# Patient Record
Sex: FEMALE | Race: WHITE | NOT HISPANIC OR LATINO | Employment: FULL TIME | URBAN - NONMETROPOLITAN AREA
[De-identification: names, ages, dates, MRNs, and addresses within clinical notes are randomized per-mention and may not be internally consistent; named-entity substitution may affect disease eponyms.]

---

## 2017-03-29 ENCOUNTER — APPOINTMENT (OUTPATIENT)
Dept: LAB | Facility: HOSPITAL | Age: 47
End: 2017-03-29

## 2017-03-29 PROCEDURE — 86003 ALLG SPEC IGE CRUDE XTRC EA: CPT | Performed by: NURSE PRACTITIONER

## 2017-03-29 PROCEDURE — 80053 COMPREHEN METABOLIC PANEL: CPT | Performed by: NURSE PRACTITIONER

## 2017-03-29 PROCEDURE — 84439 ASSAY OF FREE THYROXINE: CPT | Performed by: NURSE PRACTITIONER

## 2017-03-29 PROCEDURE — 84443 ASSAY THYROID STIM HORMONE: CPT | Performed by: NURSE PRACTITIONER

## 2017-04-04 ENCOUNTER — OFFICE VISIT (OUTPATIENT)
Dept: PULMONOLOGY | Facility: CLINIC | Age: 47
End: 2017-04-04

## 2017-04-04 VITALS
BODY MASS INDEX: 34.23 KG/M2 | HEIGHT: 62 IN | HEART RATE: 99 BPM | OXYGEN SATURATION: 98 % | SYSTOLIC BLOOD PRESSURE: 119 MMHG | DIASTOLIC BLOOD PRESSURE: 77 MMHG | WEIGHT: 186 LBS

## 2017-04-04 DIAGNOSIS — R05.3 CHRONIC COUGH: Primary | ICD-10-CM

## 2017-04-04 DIAGNOSIS — J45.41 MODERATE PERSISTENT ASTHMA WITH ACUTE EXACERBATION: ICD-10-CM

## 2017-04-04 DIAGNOSIS — J30.9 ALLERGIC RHINITIS, UNSPECIFIED ALLERGIC RHINITIS TRIGGER, UNSPECIFIED RHINITIS SEASONALITY: ICD-10-CM

## 2017-04-04 PROCEDURE — 94060 EVALUATION OF WHEEZING: CPT | Performed by: INTERNAL MEDICINE

## 2017-04-04 PROCEDURE — 99204 OFFICE O/P NEW MOD 45 MIN: CPT | Performed by: INTERNAL MEDICINE

## 2017-04-04 PROCEDURE — 94727 GAS DIL/WSHOT DETER LNG VOL: CPT | Performed by: INTERNAL MEDICINE

## 2017-04-04 PROCEDURE — 94729 DIFFUSING CAPACITY: CPT | Performed by: INTERNAL MEDICINE

## 2017-04-04 RX ORDER — PREDNISONE 10 MG/1
TABLET ORAL
Qty: 22 TABLET | Refills: 0 | Status: SHIPPED | OUTPATIENT
Start: 2017-04-04 | End: 2017-04-25

## 2017-04-04 RX ORDER — MONTELUKAST SODIUM 10 MG/1
10 TABLET ORAL NIGHTLY
Qty: 30 TABLET | Refills: 11 | Status: SHIPPED | OUTPATIENT
Start: 2017-04-04 | End: 2017-12-31 | Stop reason: SDUPTHER

## 2017-04-04 RX ORDER — ALBUTEROL SULFATE 90 UG/1
2 AEROSOL, METERED RESPIRATORY (INHALATION) EVERY 4 HOURS PRN
Qty: 1 INHALER | Refills: 11 | Status: SHIPPED | OUTPATIENT
Start: 2017-04-04 | End: 2018-07-17 | Stop reason: SDUPTHER

## 2017-04-04 NOTE — PROGRESS NOTES
Pulmonary Consultation    Subjective     Chief Complaint   Patient presents with   • Bronchitis     Ref by Jadyn Bryant        History of Present Illness  Lora Steiner is a 46 y.o. female with a PMH significant for anemia and back pain who presents for evaluation of chronic cough. Pt states she has had recurrent cough and congestion since October when she had an illness. She was treated for bronchitis with abx and steroids. Her cough recurred in November and she received additional abx. She improved some but it did not completely resolved. Pt caught the flu in February and her cough worsened. She was treated with abx and tamiflu but her cough persisted. She went to  last week and given Levaquin as well as a breathing treatment. Pt does report the breathing treatment helped for a few hours but it wore off. She is a never smoker, but her parents smoked. She denies prior history of asthma. Her father has COPD and her son has asthma. She has seasonal allergies and reports nasal congestion and postnasal gtt. Pt has occasional thick yellow phlegm, but denies hemoptysis. She does have some wheezing and trouble lying flat at night. Her dyspnea with exertion and sometimes at rest. 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     Review of Systems: History obtained from chart review and the patient.  Review of Systems   HENT: Positive for congestion and postnasal drip.    Respiratory: Positive for cough, shortness of breath and wheezing.    Cardiovascular: Positive for leg swelling.   Gastrointestinal:        Heartburn   Musculoskeletal: Positive for back pain.   Neurological: Positive for headaches.     As described in the HPI. Otherwise, remainder of ROS (14 systems) were negative.    Patient Active Problem List   Diagnosis   • Abdominal pain   • Abnormal mammogram   • Back pain   • Skin sensation disturbance   • Abnormal radiographic examination   • Otalgia   • Allergic rhinitis   •  "Moderate persistent asthma with acute exacerbation   • Chronic cough         Current Outpatient Prescriptions:   •  benzonatate (TESSALON) 200 MG capsule, Take 1 capsule by mouth 3 (Three) Times a Day As Needed for Cough., Disp: 30 capsule, Rfl: 0  •  gabapentin (NEURONTIN) 100 MG capsule, Take 100 mg by mouth 3 (Three) Times a Day., Disp: , Rfl:   •  levonorgestrel (MIRENA) 20 MCG/24HR IUD, 1 each by Intrauterine route., Disp: , Rfl:   •  OMEPRAZOLE PO, Take  by mouth., Disp: , Rfl:   •  ranitidine (ZANTAC) 150 MG capsule, Take 1 capsule by mouth 2 (Two) Times a Day for 30 days., Disp: 60 capsule, Rfl: 0  •  albuterol (VENTOLIN HFA) 108 (90 BASE) MCG/ACT inhaler, Inhale 2 puffs Every 4 (Four) Hours As Needed for Wheezing or Shortness of Air., Disp: 1 inhaler, Rfl: 11  •  Fluticasone Furoate-Vilanterol 100-25 MCG/INH aerosol powder , Inhale 1 puff Daily., Disp: 1 each, Rfl: 11  •  montelukast (SINGULAIR) 10 MG tablet, Take 1 tablet by mouth Every Night., Disp: 30 tablet, Rfl: 11  •  predniSONE (DELTASONE) 10 MG tablet, Take 40mg PO x 3d, then 30mg PO x 2d, then 20mg PO x 2d then stop, Disp: 22 tablet, Rfl: 0    History reviewed. No pertinent past medical history.  Past Surgical History:   Procedure Laterality Date   • VAGINAL DELIVERY      hx of 3 spontaneous abortions in 1995,1996,2002     Social History     Social History   • Marital status:      Spouse name: N/A   • Number of children: N/A   • Years of education: N/A     Social History Main Topics   • Smoking status: Never Smoker   • Smokeless tobacco: Never Used   • Alcohol use None   • Drug use: None   • Sexual activity: Not Asked     Other Topics Concern   • None     Social History Narrative     Family History   Problem Relation Age of Onset   • Hypertension Father    • Asthma Other    • Cancer Other    • Diabetes Other    • Kidney disease Other    • Migraines Other           Objective     Blood pressure 119/77, pulse 99, height 62\" (157.5 cm), weight " 186 lb (84.4 kg), SpO2 98 %.  Physical Exam   Constitutional: She is oriented to person, place, and time. Vital signs are normal. She appears well-developed and well-nourished.   HENT:   Head: Normocephalic and atraumatic.   Nose: Mucosal edema and rhinorrhea present.   Mouth/Throat: Uvula is midline, oropharynx is clear and moist and mucous membranes are normal.   Mallampati 3   Eyes: Conjunctivae, EOM and lids are normal. Pupils are equal, round, and reactive to light.   Neck: Trachea normal and normal range of motion. No tracheal tenderness present. No thyroid mass present.   Cardiovascular: Normal rate, regular rhythm and normal heart sounds.  Exam reveals no gallop.    No murmur heard.  Pulmonary/Chest: Effort normal and breath sounds normal. No respiratory distress. She has no decreased breath sounds. She has no wheezes. She has no rhonchi. Chest wall is not dull to percussion. She exhibits no tenderness.   Cough with deep breathing   Abdominal: Soft. Normal appearance and bowel sounds are normal. There is no tenderness.   Lymphadenopathy:        Head (right side): No submandibular adenopathy present.        Head (left side): No submandibular adenopathy present.     She has no cervical adenopathy.        Right: No supraclavicular adenopathy present.        Left: No supraclavicular adenopathy present.   Neurological: She is alert and oriented to person, place, and time.   Skin: Skin is warm and dry. No cyanosis. Nails show no clubbing.   Psychiatric: She has a normal mood and affect. Her speech is normal and behavior is normal. Judgment normal.   Nursing note and vitals reviewed.      PFTs: 4/4/17  Ratio 77  FVC 2.31/68%  FEV1 1.78/66%  TLC 2.84/59%  RV/%  DLCO 22.01/102%  Mixed obstruction and restriction with significant bronchodilator response.  Air trapping.  Normal diffusion.  No comparative data available.     Radiology (independently reviewed and interpreted by me): CXR 3/28/17 showed no acute  cardiopulmonary disease       Assessment/Plan     Lora was seen today for bronchitis.    Diagnoses and all orders for this visit:    Chronic cough    Allergic rhinitis, unspecified allergic rhinitis trigger, unspecified rhinitis seasonality    Moderate persistent asthma with acute exacerbation  -     albuterol (VENTOLIN HFA) 108 (90 BASE) MCG/ACT inhaler; Inhale 2 puffs Every 4 (Four) Hours As Needed for Wheezing or Shortness of Air.  -     Fluticasone Furoate-Vilanterol 100-25 MCG/INH aerosol powder ; Inhale 1 puff Daily.  -     montelukast (SINGULAIR) 10 MG tablet; Take 1 tablet by mouth Every Night.  -     IgE Level; Future  -     predniSONE (DELTASONE) 10 MG tablet; Take 40mg PO x 3d, then 30mg PO x 2d, then 20mg PO x 2d then stop         Discussion/ Recommendations:   PFTs are most consistent with asthma given the reversibility.  I think she likely has adult onset asthma which may be allergy related and was triggered by bronchitis in the fall.  She did have an allergy profile done which showed multiple environmental allergies, but she is not currently on any therapy so I'll hold on referral to an allergist.    -Start Breo 100 daily.  Samples provided and instructed on use.  Provided with co-pay card.  -Start albuterol as needed.  Instructed on proper technique.  -7 day prednisone taper to help calm her symptoms.  -Start Singulair daily.  Consider addition of nasal steroid.  -Check IgE level.  -Follow-up in 2 weeks to reassess.         Return in about 2 weeks (around 4/18/2017) for Recheck.      Thank you for allowing me to participate in the care of Lora Steiner. Please do not hesitate to contact me with any questions.         This document has been electronically signed by Clau Barlow MD on April 4, 2017 2:37 PM      EMR Dragon/Transcription disclaimer:     Much of this encounter note is an electronic transcription/translation of spoken language to printed text. The electronic translation of spoken  language may permit erroneous, or at times, nonsensical words or phrases to be inadvertently transcribed; Although I have reviewed the note for such errors, some may still exist.

## 2017-04-04 NOTE — PATIENT INSTRUCTIONS
Take prednisone taper as directed  Start Breo 1 puff daily.  Rinse mouth well after use.  Use albuterol 2 puffs every 4 hours as needed.

## 2017-04-20 DIAGNOSIS — K21.9 GASTROESOPHAGEAL REFLUX DISEASE, ESOPHAGITIS PRESENCE NOT SPECIFIED: ICD-10-CM

## 2017-04-25 ENCOUNTER — OFFICE VISIT (OUTPATIENT)
Dept: PULMONOLOGY | Facility: CLINIC | Age: 47
End: 2017-04-25

## 2017-04-25 VITALS
OXYGEN SATURATION: 99 % | DIASTOLIC BLOOD PRESSURE: 69 MMHG | BODY MASS INDEX: 34.36 KG/M2 | SYSTOLIC BLOOD PRESSURE: 128 MMHG | WEIGHT: 186.7 LBS | HEIGHT: 62 IN | HEART RATE: 79 BPM

## 2017-04-25 DIAGNOSIS — J45.41 MODERATE PERSISTENT ASTHMA WITH ACUTE EXACERBATION: ICD-10-CM

## 2017-04-25 DIAGNOSIS — J30.9 ALLERGIC RHINITIS, UNSPECIFIED ALLERGIC RHINITIS TRIGGER, UNSPECIFIED RHINITIS SEASONALITY: ICD-10-CM

## 2017-04-25 DIAGNOSIS — R05.3 CHRONIC COUGH: Primary | ICD-10-CM

## 2017-04-25 PROCEDURE — 99214 OFFICE O/P EST MOD 30 MIN: CPT | Performed by: INTERNAL MEDICINE
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
## 2017-04-27 RX ORDER — RANITIDINE 150 MG/1
CAPSULE ORAL
Qty: 60 CAPSULE | Refills: 0 | OUTPATIENT
Start: 2017-04-27

## 2017-05-22 ENCOUNTER — OFFICE VISIT (OUTPATIENT)
Dept: PULMONOLOGY | Facility: CLINIC | Age: 47
End: 2017-05-22

## 2017-05-22 VITALS
BODY MASS INDEX: 34.78 KG/M2 | SYSTOLIC BLOOD PRESSURE: 121 MMHG | DIASTOLIC BLOOD PRESSURE: 76 MMHG | HEIGHT: 62 IN | OXYGEN SATURATION: 98 % | WEIGHT: 189 LBS | HEART RATE: 89 BPM

## 2017-05-22 DIAGNOSIS — J45.40 MODERATE PERSISTENT ASTHMA WITHOUT COMPLICATION: Primary | ICD-10-CM

## 2017-05-22 DIAGNOSIS — J30.9 ALLERGIC RHINITIS, UNSPECIFIED ALLERGIC RHINITIS TRIGGER, UNSPECIFIED RHINITIS SEASONALITY: ICD-10-CM

## 2017-05-22 PROCEDURE — 99214 OFFICE O/P EST MOD 30 MIN: CPT | Performed by: INTERNAL MEDICINE

## 2017-05-22 RX ORDER — FLUTICASONE PROPIONATE 50 MCG
2 SPRAY, SUSPENSION (ML) NASAL DAILY
Qty: 1 EACH | Refills: 11 | Status: SHIPPED | OUTPATIENT
Start: 2017-05-22 | End: 2017-06-21

## 2017-05-23 DIAGNOSIS — J30.9 ALLERGIC RHINITIS, UNSPECIFIED ALLERGIC RHINITIS TRIGGER, UNSPECIFIED RHINITIS SEASONALITY: ICD-10-CM

## 2017-05-31 DIAGNOSIS — B37.0 THRUSH: Primary | ICD-10-CM

## 2017-06-24 ENCOUNTER — LAB (OUTPATIENT)
Dept: LAB | Facility: HOSPITAL | Age: 47
End: 2017-06-24

## 2017-06-24 ENCOUNTER — TRANSCRIBE ORDERS (OUTPATIENT)
Dept: LAB | Facility: HOSPITAL | Age: 47
End: 2017-06-24

## 2017-06-24 DIAGNOSIS — R10.13 EPIGASTRIC PAIN: ICD-10-CM

## 2017-06-24 DIAGNOSIS — R10.13 EPIGASTRIC PAIN: Primary | ICD-10-CM

## 2017-06-24 LAB
ALBUMIN SERPL-MCNC: 3.9 G/DL (ref 3.4–4.8)
ALBUMIN/GLOB SERPL: 1.1 G/DL (ref 1.1–1.8)
ALP SERPL-CCNC: 67 U/L (ref 38–126)
ALT SERPL W P-5'-P-CCNC: 28 U/L (ref 9–52)
AMYLASE SERPL-CCNC: 53 U/L (ref 50–130)
ANION GAP SERPL CALCULATED.3IONS-SCNC: 10 MMOL/L (ref 5–15)
AST SERPL-CCNC: 16 U/L (ref 14–36)
BILIRUB SERPL-MCNC: 0.5 MG/DL (ref 0.2–1.3)
BUN BLD-MCNC: 10 MG/DL (ref 7–21)
BUN/CREAT SERPL: 12.5 (ref 7–25)
CALCIUM SPEC-SCNC: 8.9 MG/DL (ref 8.4–10.2)
CHLORIDE SERPL-SCNC: 100 MMOL/L (ref 95–110)
CO2 SERPL-SCNC: 28 MMOL/L (ref 22–31)
CREAT BLD-MCNC: 0.8 MG/DL (ref 0.5–1)
DEPRECATED RDW RBC AUTO: 41.5 FL (ref 36.4–46.3)
ERYTHROCYTE [DISTWIDTH] IN BLOOD BY AUTOMATED COUNT: 14.4 % (ref 11.5–14.5)
GFR SERPL CREATININE-BSD FRML MDRD: 77 ML/MIN/1.73 (ref 60–135)
GLOBULIN UR ELPH-MCNC: 3.7 GM/DL (ref 2.3–3.5)
GLUCOSE BLD-MCNC: 92 MG/DL (ref 60–100)
HCT VFR BLD AUTO: 39.4 % (ref 35–45)
HGB BLD-MCNC: 12.8 G/DL (ref 12–15.5)
LIPASE SERPL-CCNC: 30 U/L (ref 23–300)
MCH RBC QN AUTO: 25.7 PG (ref 26.5–34)
MCHC RBC AUTO-ENTMCNC: 32.5 G/DL (ref 31.4–36)
MCV RBC AUTO: 79 FL (ref 80–98)
PLATELET # BLD AUTO: 257 10*3/MM3 (ref 150–450)
PMV BLD AUTO: 10 FL (ref 8–12)
POTASSIUM BLD-SCNC: 4.3 MMOL/L (ref 3.5–5.1)
PROT SERPL-MCNC: 7.6 G/DL (ref 6.3–8.6)
RBC # BLD AUTO: 4.99 10*6/MM3 (ref 3.77–5.16)
SODIUM BLD-SCNC: 138 MMOL/L (ref 137–145)
TSH SERPL DL<=0.05 MIU/L-ACNC: 0.69 MIU/ML (ref 0.46–4.68)
WBC NRBC COR # BLD: 9.21 10*3/MM3 (ref 3.2–9.8)

## 2017-06-24 PROCEDURE — 82150 ASSAY OF AMYLASE: CPT

## 2017-06-24 PROCEDURE — 36415 COLL VENOUS BLD VENIPUNCTURE: CPT

## 2017-06-24 PROCEDURE — 80053 COMPREHEN METABOLIC PANEL: CPT

## 2017-06-24 PROCEDURE — 85027 COMPLETE CBC AUTOMATED: CPT

## 2017-06-24 PROCEDURE — 83690 ASSAY OF LIPASE: CPT

## 2017-06-24 PROCEDURE — 84443 ASSAY THYROID STIM HORMONE: CPT

## 2017-06-30 ENCOUNTER — TRANSCRIBE ORDERS (OUTPATIENT)
Dept: LAB | Facility: HOSPITAL | Age: 47
End: 2017-06-30

## 2017-08-17 ENCOUNTER — APPOINTMENT (OUTPATIENT)
Dept: CT IMAGING | Facility: HOSPITAL | Age: 47
End: 2017-08-17

## 2017-08-17 ENCOUNTER — HOSPITAL ENCOUNTER (EMERGENCY)
Facility: HOSPITAL | Age: 47
Discharge: HOME | End: 2017-08-18
Attending: EMERGENCY MEDICINE | Admitting: EMERGENCY MEDICINE

## 2017-08-17 DIAGNOSIS — R51.9 NONINTRACTABLE EPISODIC HEADACHE, UNSPECIFIED HEADACHE TYPE: Primary | ICD-10-CM

## 2017-08-17 PROCEDURE — 70450 CT HEAD/BRAIN W/O DYE: CPT

## 2017-08-17 PROCEDURE — 99283 EMERGENCY DEPT VISIT LOW MDM: CPT

## 2017-08-18 VITALS
DIASTOLIC BLOOD PRESSURE: 82 MMHG | HEIGHT: 62 IN | BODY MASS INDEX: 34.96 KG/M2 | RESPIRATION RATE: 20 BRPM | OXYGEN SATURATION: 98 % | WEIGHT: 190 LBS | SYSTOLIC BLOOD PRESSURE: 141 MMHG | TEMPERATURE: 98.2 F | HEART RATE: 63 BPM

## 2017-08-18 NOTE — ED PROVIDER NOTES
Subjective   Patient is a 46 y.o. female presenting with headaches.   History provided by:  Patient   used: No    Headache   Pain location:  R parietal, R temporal and frontal  Quality:  Sharp and stabbing  Radiates to:  L neck and R neck  Severity currently:  3/10  Severity at highest:  8/10  Onset quality:  Sudden  Duration:  6 hours  Timing:  Constant  Progression:  Partially resolved  Chronicity:  Recurrent  Similar to prior headaches: no    Context: not activity, not exposure to bright light, not caffeine, not coughing, not defecating, not eating, not stress, not exposure to cold air, not intercourse, not loud noise and not straining    Relieved by:  NSAIDs  Worsened by:  Nothing  Associated symptoms: blurred vision, nausea and photophobia    Associated symptoms: no abdominal pain, no back pain, no congestion, no cough, no diarrhea, no drainage, no ear pain, no eye pain, no facial pain, no fatigue, no fever, no focal weakness, no hearing loss, no loss of balance, no myalgias, no near-syncope, no neck pain, no neck stiffness, no numbness, no paresthesias, no seizures, no sinus pressure, no sore throat, no swollen glands, no syncope, no tingling, no URI, no visual change, no vomiting and no weakness    Risk factors: no anger, no family hx of SAH, does not have insomnia and lifestyle not sedentary        Review of Systems   Constitutional: Negative for fatigue and fever.   HENT: Negative for congestion, ear pain, hearing loss, postnasal drip, sinus pressure and sore throat.    Eyes: Positive for blurred vision and photophobia. Negative for pain.   Respiratory: Negative for cough.    Cardiovascular: Negative for syncope and near-syncope.   Gastrointestinal: Positive for nausea. Negative for abdominal pain, diarrhea and vomiting.   Musculoskeletal: Negative for back pain, myalgias, neck pain and neck stiffness.   Neurological: Positive for headaches. Negative for focal weakness, seizures,  weakness, numbness, paresthesias and loss of balance.   All other systems reviewed and are negative.      No past medical history on file.    No Known Allergies    Past Surgical History:   Procedure Laterality Date   • VAGINAL DELIVERY      hx of 3 spontaneous abortions in 1995,1996,2002       Family History   Problem Relation Age of Onset   • Hypertension Father    • Asthma Other    • Cancer Other    • Diabetes Other    • Kidney disease Other    • Migraines Other        Social History     Social History   • Marital status:      Spouse name: N/A   • Number of children: N/A   • Years of education: N/A     Social History Main Topics   • Smoking status: Never Smoker   • Smokeless tobacco: Never Used   • Alcohol use Not on file   • Drug use: Not on file   • Sexual activity: Not on file     Other Topics Concern   • Not on file     Social History Narrative           Objective   Physical Exam   Constitutional: She is oriented to person, place, and time. She appears well-developed and well-nourished.   HENT:   Head: Normocephalic and atraumatic.   Mouth/Throat: Oropharynx is clear and moist.   Eyes: Conjunctivae are normal. Pupils are equal, round, and reactive to light.   Neck: Normal range of motion. Neck supple.   Cardiovascular: Normal rate, regular rhythm and intact distal pulses.    Pulmonary/Chest: Effort normal and breath sounds normal.   Abdominal: Soft. Bowel sounds are normal.   Musculoskeletal: Normal range of motion.   Neurological: She is alert and oriented to person, place, and time. She exhibits normal muscle tone. Coordination normal.   Skin: Skin is warm and dry.   Nursing note and vitals reviewed.      Procedures         ED Course  ED Course      Labs Reviewed - No data to display    CT Head Without Contrast   Final Result   No acute intracranial abnormality.      Electronically signed by:  Anival Garner  8/17/2017 11:30 PM   CDT Workstation: [id number]                  [facility]  Number of  Diagnoses or Management Options  Diagnosis management comments: Patient comes in with a headache although this particular headache was different than her routine headache and it did get improved after patient took Excedrin.  Patient is now any meningeal signs and is quite comfortable awake alert and oriented.  Patient's CT of head is negative for any bleed.  I do not think patient needs a lumbar puncture as patient examination is benign and I discussed options with the patient and patient agrees with current management and will follow up with primary care physician regarding further care       Amount and/or Complexity of Data Reviewed  Tests in the radiology section of CPT®: reviewed  Discussion of test results with the performing providers: yes  Obtain history from someone other than the patient: yes  Discuss the patient with other providers: yes    Risk of Complications, Morbidity, and/or Mortality  Presenting problems: high  Diagnostic procedures: high  Management options: high    Patient Progress  Patient progress: improved      Final diagnoses:   Nonintractable episodic headache, unspecified headache type            Todd Weeks MD  08/18/17 0006

## 2017-08-25 ENCOUNTER — OFFICE VISIT (OUTPATIENT)
Dept: INTERNAL MEDICINE | Facility: CLINIC | Age: 47
End: 2017-08-25

## 2017-08-25 VITALS
WEIGHT: 189.6 LBS | DIASTOLIC BLOOD PRESSURE: 80 MMHG | SYSTOLIC BLOOD PRESSURE: 120 MMHG | BODY MASS INDEX: 34.89 KG/M2 | HEIGHT: 62 IN

## 2017-08-25 DIAGNOSIS — G43.109 MIGRAINE WITH AURA AND WITHOUT STATUS MIGRAINOSUS, NOT INTRACTABLE: Primary | ICD-10-CM

## 2017-08-25 PROCEDURE — 99203 OFFICE O/P NEW LOW 30 MIN: CPT | Performed by: INTERNAL MEDICINE

## 2017-08-25 RX ORDER — ZOLMITRIPTAN 5 MG/1
TABLET, FILM COATED ORAL
Qty: 30 TABLET | Refills: 1 | Status: SHIPPED | OUTPATIENT
Start: 2017-08-25 | End: 2018-07-10

## 2017-08-25 NOTE — PROGRESS NOTES
Subjective       Patient is in for recheck after ER visit for headache. She has history of migraines and has been having headaches several times a month.  On Gabapentin 200 mg qhs for prophylaxis.States that headaches are not as severe as in the past but the last headache was different. It was accompanied by blurry vision and photophobia. She denies any other neurologic complaints.  CT head done in ER did not show any acute abnormalities.    Past Surgical History:   Procedure Laterality Date   • VAGINAL DELIVERY      hx of 3 spontaneous abortions in 1995,1996,2002       Social History   Substance Use Topics   • Smoking status: Never Smoker   • Smokeless tobacco: Never Used   • Alcohol use Not on file     Family History   Problem Relation Age of Onset   • Hypertension Father    • Asthma Other    • Cancer Other    • Diabetes Other    • Kidney disease Other    • Migraines Other      No Known Allergies  Current Outpatient Prescriptions on File Prior to Visit   Medication Sig Dispense Refill   • albuterol (VENTOLIN HFA) 108 (90 BASE) MCG/ACT inhaler Inhale 2 puffs Every 4 (Four) Hours As Needed for Wheezing or Shortness of Air. 1 inhaler 11   • Fluticasone Furoate-Vilanterol 200-25 MCG/INH aerosol powder  Inhale 1 puff Daily. 1 each 11   • gabapentin (NEURONTIN) 100 MG capsule Take 100 mg by mouth 3 (Three) Times a Day.     • levonorgestrel (MIRENA) 20 MCG/24HR IUD 1 each by Intrauterine route.     • montelukast (SINGULAIR) 10 MG tablet Take 1 tablet by mouth Every Night. 30 tablet 11   • OMEPRAZOLE PO Take  by mouth.     • [DISCONTINUED] nystatin (MYCOSTATIN) 933579 UNIT/ML suspension Take 5 mL by mouth 4 (Four) Times a Day. 280 mL 0     No current facility-administered medications on file prior to visit.      Review of Systems   Constitutional: Negative for fatigue.   HENT: Negative for postnasal drip and sinus pressure.    Eyes: Positive for visual disturbance.   Respiratory: Negative for chest tightness and shortness  of breath.    Cardiovascular: Negative for chest pain, palpitations and leg swelling.   Gastrointestinal: Negative for abdominal pain, nausea and vomiting.   Endocrine: Negative for cold intolerance, heat intolerance, polydipsia and polyuria.   Skin: Negative for color change and rash.   Neurological: Positive for headaches. Negative for dizziness, tremors, syncope, speech difficulty, light-headedness and numbness.       Objective   Physical Exam   Constitutional: She is oriented to person, place, and time. She appears well-developed and well-nourished.   HENT:   Head: Normocephalic and atraumatic.   Eyes: Conjunctivae and EOM are normal. Pupils are equal, round, and reactive to light.   Neck: Neck supple. No JVD present. No thyromegaly present.   Cardiovascular: Normal rate and regular rhythm.    Pulmonary/Chest: Effort normal and breath sounds normal.   Abdominal: Soft. Bowel sounds are normal.   Neurological: She is alert and oriented to person, place, and time. She has normal reflexes. She displays normal reflexes. No cranial nerve deficit. Coordination normal.   Skin: Skin is warm and dry.   Psychiatric: She has a normal mood and affect. Her behavior is normal. Thought content normal.   Nursing note and vitals reviewed.          Patient Active Problem List   Diagnosis   • Abdominal pain   • Abnormal mammogram   • Back pain   • Skin sensation disturbance   • Abnormal radiographic examination   • Otalgia   • Allergic rhinitis   • Moderate persistent asthma without complication   • Chronic cough     Lab on 06/24/2017   Component Date Value Ref Range Status   • TSH 06/24/2017 0.690  0.460 - 4.680 mIU/mL Final   Lab on 06/24/2017   Component Date Value Ref Range Status   • Lipase 06/24/2017 30  23 - 300 U/L Final   Lab on 06/24/2017   Component Date Value Ref Range Status   • Amylase 06/24/2017 53  50 - 130 U/L Final   Lab on 06/24/2017   Component Date Value Ref Range Status   • Glucose 06/24/2017 92  60 - 100  mg/dL Final   • BUN 06/24/2017 10  7 - 21 mg/dL Final   • Creatinine 06/24/2017 0.80  0.50 - 1.00 mg/dL Final   • Sodium 06/24/2017 138  137 - 145 mmol/L Final   • Potassium 06/24/2017 4.3  3.5 - 5.1 mmol/L Final   • Chloride 06/24/2017 100  95 - 110 mmol/L Final   • CO2 06/24/2017 28.0  22.0 - 31.0 mmol/L Final   • Calcium 06/24/2017 8.9  8.4 - 10.2 mg/dL Final   • Total Protein 06/24/2017 7.6  6.3 - 8.6 g/dL Final   • Albumin 06/24/2017 3.90  3.40 - 4.80 g/dL Final   • ALT (SGPT) 06/24/2017 28  9 - 52 U/L Final   • AST (SGOT) 06/24/2017 16  14 - 36 U/L Final   • Alkaline Phosphatase 06/24/2017 67  38 - 126 U/L Final   • Total Bilirubin 06/24/2017 0.5  0.2 - 1.3 mg/dL Final   • eGFR Non  Amer 06/24/2017 77  >60 mL/min/1.73 Final   • Globulin 06/24/2017 3.7* 2.3 - 3.5 gm/dL Final   • A/G Ratio 06/24/2017 1.1  1.1 - 1.8 g/dL Final   • BUN/Creatinine Ratio 06/24/2017 12.5  7.0 - 25.0 Final   • Anion Gap 06/24/2017 10.0  5.0 - 15.0 mmol/L Final   Lab on 06/24/2017   Component Date Value Ref Range Status   • WBC 06/24/2017 9.21  3.20 - 9.80 10*3/mm3 Final   • RBC 06/24/2017 4.99  3.77 - 5.16 10*6/mm3 Final   • Hemoglobin 06/24/2017 12.8  12.0 - 15.5 g/dL Final   • Hematocrit 06/24/2017 39.4  35.0 - 45.0 % Final   • MCV 06/24/2017 79.0* 80.0 - 98.0 fL Final   • MCH 06/24/2017 25.7* 26.5 - 34.0 pg Final   • MCHC 06/24/2017 32.5  31.4 - 36.0 g/dL Final   • RDW 06/24/2017 14.4  11.5 - 14.5 % Final   • RDW-SD 06/24/2017 41.5  36.4 - 46.3 fl Final   • MPV 06/24/2017 10.0  8.0 - 12.0 fL Final   • Platelets 06/24/2017 257  150 - 450 10*3/mm3 Final   Admission on 03/28/2017, Discharged on 03/28/2017   Component Date Value Ref Range Status   • Class Description 03/29/2017 Comment   Final   • D. pteronyssinus (dust mite) 03/29/2017 <0.10  Class 0 kU/L Final   • D. farinae (dust mite) 03/29/2017 <0.10  Class 0 kU/L Final   • Cat Dander 03/29/2017 <0.10  Class 0 kU/L Final   • Dog Dander, IgE 03/29/2017 <0.10  Class 0 kU/L  Final   • [place] Grass 03/29/2017 <0.10  Class 0 kU/L Final   • [place], Perennial 03/29/2017 <0.10  Class 0 kU/L Final   • Kumar Grass 03/29/2017 <0.10  Class 0 kU/L Final   • [place] Grass 03/29/2017 <0.10  Class 0 kU/L Final   • Cockroach, American 03/29/2017 <0.10  Class 0 kU/L Final   • Penicillium chrysogen 03/29/2017 <0.10  Class 0 kU/L Final   • Cladosporium herbarum 03/29/2017 <0.10  Class 0 kU/L Final   • Aspergillus fumigatus 03/29/2017 <0.10  Class 0 kU/L Final   • Mucor racemosus 03/29/2017 <0.10  Class 0 kU/L Final   • Alternaria alternata 03/29/2017 <0.10  Class 0 kU/L Final   • Stemphylium herbarum 03/29/2017 <0.10  Class 0 kU/L Final   • Elm, American 03/29/2017 0.22* Class 0/I kU/L Final   • Eucalyptus 03/29/2017 <0.10  Class 0 kU/L Final   • Maple/Box Elder 03/29/2017 <0.10  Class 0 kU/L Final   • Fraser, Italian 03/29/2017 <0.10  Class 0 kU/L Final   • Pepper Tree 03/29/2017 0.21* Class 0/I kU/L Final   • [place], Live/[place] 03/29/2017 <0.10  Class 0 kU/L Final   • Privet, Common 03/29/2017 <0.10  Class 0 kU/L Final   • Bayberry 03/29/2017 <0.10  Class 0 kU/L Final   • Silverio Palm 03/29/2017 <0.10  Class 0 kU/L Final   • Ragweed, Common/Short 03/29/2017 <0.10  Class 0 kU/L Final   • English Plantain 03/29/2017 <0.10  Class 0 kU/L Final   • Lamb's Quarter 03/29/2017 0.13* Class 0/I kU/L Final   • Sheep Sorrel 03/29/2017 <0.10  Class 0 kU/L Final   • Dog Fennel 03/29/2017 <0.10  Class 0 kU/L Final   • Class Description 03/29/2017 Comment   Final   • Egg White 03/29/2017 <0.10  Class 0 kU/L Final   • Milk, Cow's 03/29/2017 0.14* Class 0/I kU/L Final   • CodFish 03/29/2017 <0.10  Class 0 kU/L Final   • Wheat 03/29/2017 1.66* Class III kU/L Final   • Rye 03/29/2017 0.61* Class II kU/L Final   • Barley 03/29/2017 0.42* Class I kU/L Final   • Oats 03/29/2017 0.18* Class 0/I kU/L Final   • Corn 03/29/2017 0.56* Class II kU/L Final   • Rice 03/29/2017 0.12* Class 0/I kU/L Final   • Peanut 03/29/2017 0.32*  Class I kU/L Final   • Soybean 03/29/2017 <0.10  Class 0 kU/L Final   • White Bean 03/29/2017 <0.10  Class 0 kU/L Final   • Crab 03/29/2017 <0.10  Class 0 kU/L Final   • Shrimp 03/29/2017 <0.10  Class 0 kU/L Final   • Tomato 03/29/2017 0.40* Class I kU/L Final   • Pork 03/29/2017 <0.10  Class 0 kU/L Final   • Beef 03/29/2017 <0.10  Class 0 kU/L Final   • Carrot 03/29/2017 <0.10  Class 0 kU/L Final   • Orange 03/29/2017 <0.10  Class 0 kU/L Final   • Potato 03/29/2017 <0.10  Class 0 kU/L Final   • Tuna 03/29/2017 <0.10  Class 0 kU/L Final   • Chicken 03/29/2017 <0.10  Class 0 kU/L Final   • Green Bell Pepper 03/29/2017 <0.10  Class 0 kU/L Final   • Lettuce 03/29/2017 0.23* Class 0/I kU/L Final   • Cabbage 03/29/2017 0.14* Class 0/I kU/L Final   • Grape 03/29/2017 0.45* Class I kU/L Final   • Glucose 03/29/2017 82  60 - 100 mg/dL Final   • BUN 03/29/2017 13  7 - 21 mg/dL Final   • Creatinine 03/29/2017 0.83  0.50 - 1.00 mg/dL Final   • Sodium 03/29/2017 135* 137 - 145 mmol/L Final   • Potassium 03/29/2017 3.5  3.5 - 5.1 mmol/L Final   • Chloride 03/29/2017 97  95 - 110 mmol/L Final   • CO2 03/29/2017 25.0  22.0 - 31.0 mmol/L Final   • Calcium 03/29/2017 9.1  8.4 - 10.2 mg/dL Final   • Total Protein 03/29/2017 7.9  6.3 - 8.6 g/dL Final   • Albumin 03/29/2017 4.50  3.40 - 4.80 g/dL Final   • ALT (SGPT) 03/29/2017 33  9 - 52 U/L Final   • AST (SGOT) 03/29/2017 24  14 - 36 U/L Final   • Alkaline Phosphatase 03/29/2017 68  38 - 126 U/L Final   • Total Bilirubin 03/29/2017 0.4  0.2 - 1.3 mg/dL Final   • eGFR Non African Amer 03/29/2017 74  58 - 135 mL/min/1.73 Final   • Globulin 03/29/2017 3.4  2.3 - 3.5 gm/dL Final   • A/G Ratio 03/29/2017 1.3  1.1 - 1.8 g/dL Final   • BUN/Creatinine Ratio 03/29/2017 15.7  7.0 - 25.0 Final   • Anion Gap 03/29/2017 13.0  5.0 - 15.0 mmol/L Final   • TSH 03/29/2017 0.880  0.460 - 4.680 mIU/mL Final   • Free T4 03/29/2017 1.33  0.78 - 2.19 ng/dL Final       Assessment    Diagnosis Plan   1.  Migraine with aura and without status migrainosus, not intractable           Plan    Discussed nature of the condition and type of migraines with the patient.  Neurontin will be increased to 300 mg qhs.  Zomig 5 mg PRN acute headaches.  Side effects of the medication discussed with the patient.      Follow up in 1 month.

## 2017-09-06 ENCOUNTER — OFFICE VISIT (OUTPATIENT)
Dept: PULMONOLOGY | Facility: CLINIC | Age: 47
End: 2017-09-06

## 2017-09-06 VITALS
BODY MASS INDEX: 35.15 KG/M2 | HEART RATE: 78 BPM | DIASTOLIC BLOOD PRESSURE: 90 MMHG | WEIGHT: 191 LBS | OXYGEN SATURATION: 99 % | HEIGHT: 62 IN | SYSTOLIC BLOOD PRESSURE: 112 MMHG

## 2017-09-06 DIAGNOSIS — J45.41 MODERATE PERSISTENT ASTHMA WITH ACUTE EXACERBATION: ICD-10-CM

## 2017-09-06 DIAGNOSIS — J30.9 ALLERGIC RHINITIS, UNSPECIFIED ALLERGIC RHINITIS TRIGGER, UNSPECIFIED RHINITIS SEASONALITY: ICD-10-CM

## 2017-09-06 DIAGNOSIS — R05.3 CHRONIC COUGH: ICD-10-CM

## 2017-09-06 DIAGNOSIS — R06.02 SHORTNESS OF BREATH: Primary | ICD-10-CM

## 2017-09-06 DIAGNOSIS — J06.9 UPPER RESPIRATORY TRACT INFECTION, UNSPECIFIED TYPE: ICD-10-CM

## 2017-09-06 PROCEDURE — 99214 OFFICE O/P EST MOD 30 MIN: CPT | Performed by: INTERNAL MEDICINE

## 2017-09-06 PROCEDURE — 96372 THER/PROPH/DIAG INJ SC/IM: CPT | Performed by: INTERNAL MEDICINE

## 2017-09-06 RX ORDER — BENZONATATE 100 MG/1
100 CAPSULE ORAL 3 TIMES DAILY PRN
Qty: 42 CAPSULE | Refills: 1 | Status: SHIPPED | OUTPATIENT
Start: 2017-09-06 | End: 2017-09-20

## 2017-09-06 RX ORDER — METHYLPREDNISOLONE ACETATE 80 MG/ML
80 INJECTION, SUSPENSION INTRA-ARTICULAR; INTRALESIONAL; INTRAMUSCULAR; SOFT TISSUE ONCE
Status: COMPLETED | OUTPATIENT
Start: 2017-09-06 | End: 2017-09-06

## 2017-09-06 RX ORDER — PREDNISONE 20 MG/1
40 TABLET ORAL DAILY
Qty: 10 TABLET | Refills: 0 | Status: SHIPPED | OUTPATIENT
Start: 2017-09-06 | End: 2017-09-11

## 2017-09-06 RX ORDER — AZITHROMYCIN 250 MG/1
TABLET, FILM COATED ORAL
Qty: 5 TABLET | Refills: 0 | Status: SHIPPED | OUTPATIENT
Start: 2017-09-06 | End: 2017-09-20

## 2017-09-06 RX ADMIN — METHYLPREDNISOLONE ACETATE 80 MG: 80 INJECTION, SUSPENSION INTRA-ARTICULAR; INTRALESIONAL; INTRAMUSCULAR; SOFT TISSUE at 15:06

## 2017-09-06 NOTE — PROGRESS NOTES
Pulmonary Office Follow-up    Subjective     Lora Steiner is seen today at the office for   Chief Complaint   Patient presents with   • Breathing Problem         HPI  Lora Steiner is a 46 y.o. female with a PMH significant for asthma, anemia, and back pain who presents for follow-up of asthma and chronic cough.  She was last seen by me on 5/22/17, she was doing well on Breo. She reports last Friday she had onset of congestion and dyspnea. Pt does have a cough productive of brown sputum. She does have some nasal congestion, hoarseness, sore throat, and a temp of 102.4 on Saturday. Pt denies sick contacts. She continues on Breo and is now using her albuterol several times daily. Pt reports an asthma attack on Saturday which took using her albuterol six times before she got relief.       Patient Active Problem List   Diagnosis   • Abdominal pain   • Abnormal mammogram   • Back pain   • Skin sensation disturbance   • Abnormal radiographic examination   • Otalgia   • Allergic rhinitis   • Moderate persistent asthma without complication   • Chronic cough       Review of Systems  Review of Systems   Constitutional: Positive for fever.   HENT: Positive for congestion, postnasal drip, sore throat and voice change.    Respiratory: Positive for cough (rare), chest tightness, shortness of breath and wheezing.    Cardiovascular: Negative for leg swelling.     As described in the HPI. Otherwise, remainder of ROS (14 systems) were negative.    Medications, Allergies, Social, and Family Histories reviewed as per EMR.    Objective     Vitals:    09/06/17 1419   BP: 112/90   Pulse: 78   SpO2: 99%     Physical Exam   Constitutional: She is oriented to person, place, and time. Vital signs are normal. She appears well-developed and well-nourished.   HENT:   Head: Normocephalic and atraumatic.   Nose: Mucosal edema present.   Mouth/Throat: Uvula is midline, oropharynx is clear and moist and mucous membranes are normal.    Mallampati 3   Eyes: Conjunctivae, EOM and lids are normal.   Neck: Trachea normal and normal range of motion. No tracheal tenderness present. No thyroid mass present.   Cardiovascular: Normal rate, regular rhythm and normal heart sounds.  Exam reveals no gallop.    No murmur heard.  Pulmonary/Chest: Effort normal. No respiratory distress. She has no decreased breath sounds. She has wheezes (with cough). She has no rhonchi.   Abdominal: Soft. Normal appearance and bowel sounds are normal. There is no tenderness.   Lymphadenopathy:        Head (right side): No submandibular adenopathy present.        Head (left side): No submandibular adenopathy present.     She has no cervical adenopathy.        Right: No supraclavicular adenopathy present.        Left: No supraclavicular adenopathy present.   Neurological: She is alert and oriented to person, place, and time.   Skin: Skin is warm and dry. No cyanosis. Nails show no clubbing.   Psychiatric: She has a normal mood and affect. Her speech is normal and behavior is normal. Judgment normal.   Nursing note and vitals reviewed.          Assessment/Plan     Lora was seen today for breathing problem.    Diagnoses and all orders for this visit:    Shortness of breath    Moderate persistent asthma with acute exacerbation  -     azithromycin (ZITHROMAX) 250 MG tablet; Take 2 tablets the first day, then 1 tablet daily for 4 days.  -     predniSONE (DELTASONE) 20 MG tablet; Take 2 tablets by mouth Daily for 5 days.  -     methylPREDNISolone acetate (DEPO-medrol) injection 80 mg; Inject 1 mL into the shoulder, thigh, or buttocks 1 (One) Time.  -     benzonatate (TESSALON) 100 MG capsule; Take 1 capsule by mouth 3 (Three) Times a Day As Needed for Cough.    Chronic cough    Allergic rhinitis, unspecified allergic rhinitis trigger, unspecified rhinitis seasonality    Upper respiratory tract infection, unspecified type         Discussion/ Recommendations:   I think she is  suffering from an acute asthma exacerbation likely triggered by contraction of a viral URI.  Given that she did have fever as well as productive cough, I think it's is necessary to treat with antibiotic condition to steroids.    -Depo-Medrol 80 mg IM now followed by prednisone 40 mg daily for the next 5 days.  -Z-Christopher.  -Continue Breo daily and albuterol as needed.  -If her symptoms worsen or she has recurrence of an acute asthma attack not relieved by albuterol, she is instructed to go to the ED.  -Continue Singulair and Flonase.    Return in about 2 weeks (around 9/20/2017) for Recheck.          This document has been electronically signed by Clau Barlow MD on September 6, 2017 3:26 PM      EMR Dragon/Transcription disclaimer:     Much of this encounter note is an electronic transcription/translation of spoken language to printed text. The electronic translation of spoken language may permit erroneous, or at times, nonsensical words or phrases to be inadvertently transcribed; Although I have reviewed the note for such errors, some may still exist.

## 2017-09-20 ENCOUNTER — OFFICE VISIT (OUTPATIENT)
Dept: PULMONOLOGY | Facility: CLINIC | Age: 47
End: 2017-09-20

## 2017-09-20 VITALS
WEIGHT: 191 LBS | DIASTOLIC BLOOD PRESSURE: 86 MMHG | HEART RATE: 80 BPM | OXYGEN SATURATION: 98 % | BODY MASS INDEX: 35.15 KG/M2 | HEIGHT: 62 IN | SYSTOLIC BLOOD PRESSURE: 110 MMHG

## 2017-09-20 DIAGNOSIS — R05.3 CHRONIC COUGH: ICD-10-CM

## 2017-09-20 DIAGNOSIS — J30.9 ALLERGIC RHINITIS, UNSPECIFIED ALLERGIC RHINITIS TRIGGER, UNSPECIFIED RHINITIS SEASONALITY: ICD-10-CM

## 2017-09-20 DIAGNOSIS — J45.40 MODERATE PERSISTENT ASTHMA WITHOUT COMPLICATION: Primary | ICD-10-CM

## 2017-09-20 PROCEDURE — 99214 OFFICE O/P EST MOD 30 MIN: CPT | Performed by: INTERNAL MEDICINE

## 2017-09-20 NOTE — PROGRESS NOTES
Pulmonary Office Follow-up    Subjective     Lora Steiner is seen today at the office for   Chief Complaint   Patient presents with   • Follow-up     2 week         HPI  Lora Steiner is a 46 y.o. female with a PMH significant for asthma, anemia, and back pain who presents for follow-up of asthma and chronic cough.  She was last seen by me on 9/6/17, at which time she was suffering from an acute asthma exacerbation likely secondary to URI so I treated her with steroids and a Z-Christopher. She improved some but her dyspnea and cough have not returned to baseline. She is still having cough productive of clear mucus as well as chest heaviness and occasional wheeze.  Patient denies fevers, nasal congestion, or postnasal drip.  She did complete a Z-Christopher as well as the steroids as I prescribed at her last visit.    Patient Active Problem List   Diagnosis   • Abdominal pain   • Abnormal mammogram   • Back pain   • Skin sensation disturbance   • Abnormal radiographic examination   • Otalgia   • Allergic rhinitis   • Moderate persistent asthma without complication   • Chronic cough       Review of Systems  Review of Systems   Constitutional: Negative for fever.   HENT: Negative for congestion, postnasal drip and sore throat.    Respiratory: Positive for cough (rare), chest tightness, shortness of breath and wheezing.    Cardiovascular: Negative for leg swelling.     As described in the HPI. Otherwise, remainder of ROS (14 systems) were negative.    Medications, Allergies, Social, and Family Histories reviewed as per EMR.    Objective     Vitals:    09/20/17 1446   BP: 110/86   Pulse: 80   SpO2: 98%     Physical Exam   Constitutional: She is oriented to person, place, and time. Vital signs are normal. She appears well-developed and well-nourished.   HENT:   Head: Normocephalic and atraumatic.   Nose: No mucosal edema or rhinorrhea.   Mouth/Throat: Uvula is midline, oropharynx is clear and moist and mucous membranes are  normal.   Mallampati 3   Eyes: Conjunctivae, EOM and lids are normal.   Neck: Trachea normal and normal range of motion. No tracheal tenderness present. No thyroid mass present.   Cardiovascular: Normal rate, regular rhythm and normal heart sounds.  Exam reveals no gallop.    No murmur heard.  Pulmonary/Chest: Effort normal. No respiratory distress. She has no decreased breath sounds. She has no wheezes. She has no rhonchi.   Abdominal: Soft. Normal appearance and bowel sounds are normal. There is no tenderness.       Vascular Status -  Her exam exhibits no right foot edema. Her exam exhibits no left foot edema.  Lymphadenopathy:        Head (right side): No submandibular adenopathy present.        Head (left side): No submandibular adenopathy present.     She has no cervical adenopathy.        Right: No supraclavicular adenopathy present.        Left: No supraclavicular adenopathy present.   Neurological: She is alert and oriented to person, place, and time.   Skin: Skin is warm and dry. No cyanosis. Nails show no clubbing.   Psychiatric: She has a normal mood and affect. Her speech is normal and behavior is normal. Judgment normal.   Nursing note and vitals reviewed.          Assessment/Plan     Lora was seen today for follow-up.    Diagnoses and all orders for this visit:    Moderate persistent asthma without complication  -     Umeclidinium Bromide 62.5 MCG/INH aerosol powder ; Inhale 1 puff Daily.    Chronic cough    Allergic rhinitis, unspecified allergic rhinitis trigger, unspecified rhinitis seasonality         Discussion/ Recommendations:   I think she is still recovering from her latest exacerbation, but I am concerned that she continues to have significant dyspnea and chest tightness.  I think at this time it is worth escalating her therapy with the addition of a LAMA.  I anticipate when she is better controlled, we will be able to de-escalate back to Breo alone.    -Start Incruse daily.  Sample  provided and instructed on use.  -Continue Breo daily and albuterol as needed.  -Continue Singulair and Flonase.  -If her symptoms worsen or she has recurrence of an acute asthma attack not relieved by albuterol, she was instructed to go to the ED.  -Trigger avoidance.    Return in about 4 weeks (around 10/18/2017) for Recheck.          This document has been electronically signed by Clau Barlow MD on September 20, 2017 3:14 PM      EMR Dragon/Transcription disclaimer:     Much of this encounter note is an electronic transcription/translation of spoken language to printed text. The electronic translation of spoken language may permit erroneous, or at times, nonsensical words or phrases to be inadvertently transcribed; Although I have reviewed the note for such errors, some may still exist.

## 2017-09-29 ENCOUNTER — DOCUMENTATION (OUTPATIENT)
Dept: PULMONOLOGY | Facility: CLINIC | Age: 47
End: 2017-09-29

## 2017-09-29 NOTE — PROGRESS NOTES
Received fax from [facility] regarding patient's fill history of her medications.  It shows that her Breo 200 was initially prescribed in April, but there have been at least 2 months where she went 60 days prior to refilling.  At her last visit, I did escalate her therapy with the addition of a LAMA as she continued to have symptoms and had endorsed compliance with Breo daily.  She is scheduled to follow-up with me in a couple weeks and I will discuss her compliance with her to ensure she is using her inhalers appropriately.

## 2017-10-19 ENCOUNTER — OFFICE VISIT (OUTPATIENT)
Dept: PULMONOLOGY | Facility: CLINIC | Age: 47
End: 2017-10-19

## 2017-10-19 VITALS
WEIGHT: 194.5 LBS | HEART RATE: 76 BPM | HEIGHT: 62 IN | OXYGEN SATURATION: 98 % | SYSTOLIC BLOOD PRESSURE: 118 MMHG | BODY MASS INDEX: 35.79 KG/M2 | DIASTOLIC BLOOD PRESSURE: 80 MMHG

## 2017-10-19 DIAGNOSIS — J30.2 CHRONIC SEASONAL ALLERGIC RHINITIS, UNSPECIFIED TRIGGER: ICD-10-CM

## 2017-10-19 DIAGNOSIS — E66.09 CLASS 1 OBESITY DUE TO EXCESS CALORIES WITHOUT SERIOUS COMORBIDITY WITH BODY MASS INDEX (BMI) OF 34.0 TO 34.9 IN ADULT: ICD-10-CM

## 2017-10-19 DIAGNOSIS — J45.40 MODERATE PERSISTENT ASTHMA WITHOUT COMPLICATION: Primary | ICD-10-CM

## 2017-10-19 PROCEDURE — 99214 OFFICE O/P EST MOD 30 MIN: CPT | Performed by: INTERNAL MEDICINE

## 2017-10-19 RX ORDER — MELOXICAM 15 MG/1
TABLET ORAL
Refills: 0 | Status: ON HOLD | COMMUNITY
Start: 2017-10-11 | End: 2018-04-13 | Stop reason: SDUPTHER

## 2017-10-19 NOTE — PROGRESS NOTES
Pulmonary Office Follow-up    Subjective     Lora Steiner is seen today at the office for   Chief Complaint   Patient presents with   • Follow-up     4 week         HPI  Lora Steiner is a 46 y.o. female with a PMH significant for asthma, anemia, and back pain who presents for follow-up of asthma.  She was last seen 9/20/17, at which time she'll have issues so I recommended starting Incruse in addition to her Breo.  Following that visit, I did receive medication from her pharmacy bring up concerns that the patient was not getting her Breo filled a monthly basis, which may have contributed to her persistent symptoms. She does feel like her breathing is better since starting the Incruse. Pt is only using her albuterol a few times a week, whereas at the last visit she was using it daily. She still has some DOMINGO, but it is not as severe and her cough has resolved. Pt denies recent illness or steroid use. She endorses compliance with her Breo and states the time she had an excessive duration before refills were when I provided her with samples.    Patient Active Problem List   Diagnosis   • Abdominal pain   • Abnormal mammogram   • Back pain   • Skin sensation disturbance   • Abnormal radiographic examination   • Otalgia   • Allergic rhinitis   • Moderate persistent asthma without complication   • Chronic cough   • Class 1 obesity due to excess calories without serious comorbidity with body mass index (BMI) of 34.0 to 34.9 in adult       Review of Systems  Review of Systems   Constitutional: Negative for fever.   HENT: Negative for congestion, postnasal drip and sore throat.    Respiratory: Positive for shortness of breath. Negative for cough, chest tightness and wheezing.    Cardiovascular: Negative for leg swelling.     As described in the HPI. Otherwise, remainder of ROS (14 systems) were negative.    Medications, Allergies, Social, and Family Histories reviewed as per EMR.    Objective     Vitals:    10/19/17  1451   BP: 118/80   Pulse: 76   SpO2: 98%     Physical Exam   Constitutional: She is oriented to person, place, and time. Vital signs are normal. She appears well-developed and well-nourished.   HENT:   Head: Normocephalic and atraumatic.   Mouth/Throat: Uvula is midline, oropharynx is clear and moist and mucous membranes are normal.   Mallampati 3   Eyes: Conjunctivae, EOM and lids are normal.   Neck: Trachea normal and normal range of motion. No tracheal tenderness present. No thyroid mass present.   Cardiovascular: Normal rate, regular rhythm and normal heart sounds.  Exam reveals no gallop.    No murmur heard.  Pulmonary/Chest: Effort normal. No respiratory distress. She has no decreased breath sounds. She has no wheezes. She has no rhonchi.   Abdominal: Soft. Normal appearance.       Vascular Status -  Her exam exhibits no right foot edema. Her exam exhibits no left foot edema.  Lymphadenopathy:        Head (right side): No submandibular adenopathy present.        Head (left side): No submandibular adenopathy present.     She has no cervical adenopathy.        Right: No supraclavicular adenopathy present.        Left: No supraclavicular adenopathy present.   Neurological: She is alert and oriented to person, place, and time.   Skin: Skin is warm and dry. No cyanosis. Nails show no clubbing.   Psychiatric: She has a normal mood and affect. Her speech is normal and behavior is normal. Judgment normal.   Nursing note and vitals reviewed.          Assessment/Plan     Lora was seen today for follow-up.    Diagnoses and all orders for this visit:    Moderate persistent asthma without complication    Chronic seasonal allergic rhinitis, unspecified trigger    Class 1 obesity due to excess calories without serious comorbidity with body mass index (BMI) of 34.0 to 34.9 in adult         Discussion/ Recommendations:   Her asthma is better controlled since starting the Incruse.  It is difficult to say whether it is to the  Incruse that makes difference or her prolonged exacerbation had run its course.  She does endorse compliance with her bronchodilators.    -Continue Breo daily and albuterol as needed.  -Continue Incruse for now.  Towards the end of her current inhaler, she should try stopping to determine if de-escalation is possible.  If she does not feel like she has returned to baseline prior to trying this.  She is instructed to continue on it.  If after stopping the Incruse and she has worsening of her breathing, she should restart it.  -Continue Singulair and Flonase.  -Up to date with flu vaccine.  -Trigger avoidance.    Return in about 3 months (around 1/19/2018) for Recheck.          This document has been electronically signed by Clau Barlow MD on October 19, 2017 3:01 PM      EMR Dragon/Transcription disclaimer:     Much of this encounter note is an electronic transcription/translation of spoken language to printed text. The electronic translation of spoken language may permit erroneous, or at times, nonsensical words or phrases to be inadvertently transcribed; Although I have reviewed the note for such errors, some may still exist.

## 2017-12-27 ENCOUNTER — TRANSCRIBE ORDERS (OUTPATIENT)
Dept: ORTHOPEDIC SURGERY | Facility: CLINIC | Age: 47
End: 2017-12-27

## 2017-12-27 DIAGNOSIS — M25.512 LEFT SHOULDER PAIN, UNSPECIFIED CHRONICITY: Primary | ICD-10-CM

## 2017-12-29 DIAGNOSIS — M25.512 LEFT SHOULDER PAIN, UNSPECIFIED CHRONICITY: Primary | ICD-10-CM

## 2017-12-31 DIAGNOSIS — J45.41 MODERATE PERSISTENT ASTHMA WITH ACUTE EXACERBATION: ICD-10-CM

## 2018-01-02 ENCOUNTER — OFFICE VISIT (OUTPATIENT)
Dept: ORTHOPEDIC SURGERY | Facility: CLINIC | Age: 48
End: 2018-01-02

## 2018-01-02 VITALS — BODY MASS INDEX: 35.3 KG/M2 | WEIGHT: 193 LBS

## 2018-01-02 DIAGNOSIS — M25.512 ACUTE PAIN OF LEFT SHOULDER: ICD-10-CM

## 2018-01-02 DIAGNOSIS — M75.42 IMPINGEMENT SYNDROME, SHOULDER, LEFT: ICD-10-CM

## 2018-01-02 DIAGNOSIS — M75.102 ROTATOR CUFF SYNDROME OF LEFT SHOULDER: Primary | ICD-10-CM

## 2018-01-02 PROCEDURE — 99203 OFFICE O/P NEW LOW 30 MIN: CPT | Performed by: ORTHOPAEDIC SURGERY

## 2018-01-02 RX ORDER — MONTELUKAST SODIUM 10 MG/1
TABLET ORAL
Qty: 90 TABLET | Refills: 4 | Status: ON HOLD | OUTPATIENT
Start: 2018-01-02 | End: 2018-04-13 | Stop reason: SDUPTHER

## 2018-01-02 NOTE — PROGRESS NOTES
Lora Steiner is a 47 y.o. female   Primary provider:  Sushila Oden MD       Chief Complaint   Patient presents with   • Left Shoulder - Consult       HISTORY OF PRESENT ILLNESS:   Patient is here for consult- left shoulder pain. Patient was referred to our office by Joseph Nielsen MD. Patient has also been seen by [facility]. [facility] obtained xray's. Patient states that Dr. Nielsen injected her left shoulder during her office visit on 11/9/2017. Patient states that the injection just increased the pain following for a few days after. Patient has attended recommended courses of PT with no relief thus far. Patient is currently taking Mobic and Gabapentin which she believes does provide some relief along with applying ice several times a day. Patient was sent to [facility] upon arrival.   Has done 3 months of PT, had injection in left shoulder.  Working light duty  Occasional numbness and tingling in 4th and 5th fingers.  Patient is left hand dominant.  Having pain at night - wakes her up.    History of Present Illness     CONCURRENT MEDICAL HISTORY:    History reviewed. No pertinent past medical history.    No Known Allergies      Current Outpatient Prescriptions:   •  albuterol (VENTOLIN HFA) 108 (90 BASE) MCG/ACT inhaler, Inhale 2 puffs Every 4 (Four) Hours As Needed for Wheezing or Shortness of Air., Disp: 1 inhaler, Rfl: 11  •  Fluticasone Furoate-Vilanterol 200-25 MCG/INH aerosol powder , Inhale 1 puff Daily., Disp: 1 each, Rfl: 11  •  gabapentin (NEURONTIN) 100 MG capsule, Take 100 mg by mouth 3 (Three) Times a Day., Disp: , Rfl:   •  levonorgestrel (MIRENA) 20 MCG/24HR IUD, 1 each by Intrauterine route., Disp: , Rfl:   •  meloxicam (MOBIC) 15 MG tablet, TK 1 T PO D WF, Disp: , Rfl: 0  •  montelukast (SINGULAIR) 10 MG tablet, TAKE 1 TABLET BY MOUTH EVERY NIGHT AT BEDTIME, Disp: 90 tablet, Rfl: 4  •  OMEPRAZOLE PO, Take  by mouth., Disp: , Rfl:   •  Umeclidinium Bromide 62.5 MCG/INH aerosol powder ,  Inhale 1 puff Daily., Disp: 1 each, Rfl: 11  •  ZOLMitriptan (ZOMIG) 5 MG tablet, 1 Po PRN headache, can repeat in 2 hrs if headache recurrence., Disp: 30 tablet, Rfl: 1    Past Surgical History:   Procedure Laterality Date   • VAGINAL DELIVERY      hx of 3 spontaneous abortions in 1995,1996,2002       Family History   Problem Relation Age of Onset   • Hypertension Father    • Asthma Other    • Cancer Other    • Diabetes Other    • Kidney disease Other    • Migraines Other         Social History     Social History   • Marital status:      Spouse name: N/A   • Number of children: N/A   • Years of education: N/A     Occupational History   • Not on file.     Social History Main Topics   • Smoking status: Never Smoker   • Smokeless tobacco: Never Used   • Alcohol use No   • Drug use: No   • Sexual activity: Defer     Other Topics Concern   • Not on file     Social History Narrative        Review of Systems   Constitutional: Positive for activity change.   HENT: Negative.    Eyes: Negative.    Respiratory: Negative.    Cardiovascular: Negative.    Gastrointestinal: Negative.    Endocrine: Negative.    Genitourinary: Negative.    Musculoskeletal: Negative.    Skin: Negative.    Allergic/Immunologic: Negative.    Neurological: Negative.    Hematological: Negative.    Psychiatric/Behavioral: Negative.        PHYSICAL EXAMINATION:       Wt 87.5 kg (193 lb)  BMI 35.3 kg/m2    Physical Exam   Constitutional: She is oriented to person, place, and time. She appears well-developed and well-nourished.   Neurological: She is alert and oriented to person, place, and time.   Psychiatric: She has a normal mood and affect. Her behavior is normal. Judgment and thought content normal.       GAIT:     [x]  Normal  []  Antalgic    Assistive device: [x]  None  []  Walker     []  Crutches  []  Cane     []  Wheelchair  []  Stretcher    Right Shoulder Exam     Tenderness   The patient is experiencing no tenderness.        Range of  Motion   The patient has normal right shoulder ROM.    Muscle Strength   The patient has normal right shoulder strength.    Tests   Hawkin's test: negative    Other   Erythema: absent  Sensation: normal  Pulse: present      Left Shoulder Exam     Range of Motion   Active Abduction: 90 (140 with assistance)   Forward Flexion: 110 (140 with assistance)   Internal Rotation 0 degrees: L5     Muscle Strength   Abduction: 4/5   Supraspinatus: 4/5     Tests   Hawkin's test: positive  Impingement: positive    Other   Erythema: absent  Sensation: normal  Pulse: present     Comments:  +empty can test.                      ASSESSMENT:    Diagnoses and all orders for this visit:    Rotator cuff syndrome of left shoulder  -     MRI shoulder left wo contrast; Future    Acute pain of left shoulder  -     MRI shoulder left wo contrast; Future    Impingement syndrome, shoulder, left  -     MRI shoulder left wo contrast; Future          PLAN    Patient with continued pain.  Symptoms consistent with rotator cuff issues.  Had had PT and injection into left shoulder without improvement.  MRI of C-spine does show some foraminal stenosis that is severe at C4-C5 on right and moderate on left.  C5-6 shows moderate stenosis on both sides.    Symptoms seem to more shoulder related.   Needs an MRI of left shoulder to further evaluate.  We discussed possible repeat injection, possible surgery, and possible repeat eval by spine surgery if shoulder MRI is not impressive for shoulder pathology.    Return for recheck for MRI results.    Aaron Tan MD

## 2018-01-09 ENCOUNTER — HOSPITAL ENCOUNTER (OUTPATIENT)
Dept: MRI IMAGING | Facility: HOSPITAL | Age: 48
Discharge: HOME | End: 2018-01-09
Attending: ORTHOPAEDIC SURGERY | Admitting: ORTHOPAEDIC SURGERY

## 2018-01-09 DIAGNOSIS — M75.102 ROTATOR CUFF SYNDROME OF LEFT SHOULDER: ICD-10-CM

## 2018-01-09 DIAGNOSIS — M75.42 IMPINGEMENT SYNDROME, SHOULDER, LEFT: ICD-10-CM

## 2018-01-09 DIAGNOSIS — M25.512 ACUTE PAIN OF LEFT SHOULDER: ICD-10-CM

## 2018-01-09 PROCEDURE — 73221 MRI JOINT UPR EXTREM W/O DYE: CPT

## 2018-01-19 ENCOUNTER — OFFICE VISIT (OUTPATIENT)
Dept: ORTHOPEDIC SURGERY | Facility: CLINIC | Age: 48
End: 2018-01-19

## 2018-01-19 VITALS — WEIGHT: 192 LBS | HEIGHT: 62 IN | BODY MASS INDEX: 35.33 KG/M2

## 2018-01-19 DIAGNOSIS — M75.42 IMPINGEMENT SYNDROME, SHOULDER, LEFT: ICD-10-CM

## 2018-01-19 DIAGNOSIS — M54.12 LEFT CERVICAL RADICULOPATHY: ICD-10-CM

## 2018-01-19 DIAGNOSIS — M75.102 ROTATOR CUFF SYNDROME OF LEFT SHOULDER: ICD-10-CM

## 2018-01-19 DIAGNOSIS — M25.512 ACUTE PAIN OF LEFT SHOULDER: Primary | ICD-10-CM

## 2018-01-19 PROCEDURE — 20610 DRAIN/INJ JOINT/BURSA W/O US: CPT | Performed by: ORTHOPAEDIC SURGERY

## 2018-01-19 PROCEDURE — 99214 OFFICE O/P EST MOD 30 MIN: CPT | Performed by: ORTHOPAEDIC SURGERY

## 2018-01-19 RX ADMIN — TRIAMCINOLONE ACETONIDE 40 MG: 40 INJECTION, SUSPENSION INTRA-ARTICULAR; INTRAMUSCULAR at 08:51

## 2018-01-19 RX ADMIN — LIDOCAINE HYDROCHLORIDE 2 ML: 10 INJECTION, SOLUTION INFILTRATION; PERINEURAL at 08:51

## 2018-01-19 NOTE — PROGRESS NOTES
"Lora Steiner is a 47 y.o. female returns for     Chief Complaint   Patient presents with   • Left Shoulder - Follow-up   • Results       HISTORY OF PRESENT ILLNESS:  Still having pain and weakness in left shoulder.  Pain at night.  Having occasional numbness and tingling down left arm.     CONCURRENT MEDICAL HISTORY:    No past medical history on file.    No Known Allergies      Current Outpatient Prescriptions:   •  albuterol (VENTOLIN HFA) 108 (90 BASE) MCG/ACT inhaler, Inhale 2 puffs Every 4 (Four) Hours As Needed for Wheezing or Shortness of Air., Disp: 1 inhaler, Rfl: 11  •  Fluticasone Furoate-Vilanterol 200-25 MCG/INH aerosol powder , Inhale 1 puff Daily., Disp: 1 each, Rfl: 11  •  gabapentin (NEURONTIN) 100 MG capsule, Take 100 mg by mouth 3 (Three) Times a Day., Disp: , Rfl:   •  levonorgestrel (MIRENA) 20 MCG/24HR IUD, 1 each by Intrauterine route., Disp: , Rfl:   •  meloxicam (MOBIC) 15 MG tablet, TK 1 T PO D WF, Disp: , Rfl: 0  •  montelukast (SINGULAIR) 10 MG tablet, TAKE 1 TABLET BY MOUTH EVERY NIGHT AT BEDTIME, Disp: 90 tablet, Rfl: 4  •  OMEPRAZOLE PO, Take  by mouth., Disp: , Rfl:   •  Umeclidinium Bromide 62.5 MCG/INH aerosol powder , Inhale 1 puff Daily., Disp: 1 each, Rfl: 11  •  ZOLMitriptan (ZOMIG) 5 MG tablet, 1 Po PRN headache, can repeat in 2 hrs if headache recurrence., Disp: 30 tablet, Rfl: 1    Past Surgical History:   Procedure Laterality Date   • VAGINAL DELIVERY      hx of 3 spontaneous abortions in 1995,1996,2002       ROS  No fevers or chills.  No chest pain or shortness of air.  No GI or  disturbances.    PHYSICAL EXAMINATION:       Ht 157.5 cm (62\")  Wt 87.1 kg (192 lb)  BMI 35.12 kg/m2    Physical Exam   Constitutional: She is oriented to person, place, and time. She appears well-developed and well-nourished.   Neurological: She is alert and oriented to person, place, and time.   Psychiatric: She has a normal mood and affect. Her behavior is normal. Judgment and thought " content normal.       GAIT:     [x]  Normal  []  Antalgic    Assistive device: [x]  None  []  Walker     []  Crutches  []  Cane     []  Wheelchair  []  Stretcher    Right Shoulder Exam     Tenderness   The patient is experiencing no tenderness.        Range of Motion   The patient has normal right shoulder ROM.    Muscle Strength   The patient has normal right shoulder strength.    Tests   Hawkin's test: negative    Other   Erythema: absent  Sensation: normal  Pulse: present      Left Shoulder Exam     Range of Motion   Active Abduction: 90 (140 with assistance)   Forward Flexion: 110 (140 with assistance)   Internal Rotation 0 degrees: L5     Muscle Strength   Abduction: 4/5   Supraspinatus: 4/5     Tests   Hawkin's test: positive  Impingement: positive    Other   Erythema: absent  Sensation: normal  Pulse: present     Comments:  +empty can test.              Xr Shoulder 2+ View Left    Result Date: 1/2/2018  Narrative: 3 views of the left shoulder show acceptable position and alignment of the glenohumeral joint with no acute bony abnormality.  There is moderate arthritic change at the acromioclavicular joint.  No fracture or dislocation is noted. 01/02/18 at 5:27 PM by Aaron Tan MD      Mri Shoulder Left Wo Contrast    Result Date: 1/10/2018  Narrative: Procedure: MR left shoulder without contrast Reason for exam: Left shoulder pain. Impingement syndrome of left shoulder. FINDINGS: Multisequence multiplanar MR imaging of the left shoulder was performed without contrast. Bony structures of the left shoulder reveal degenerative changes of the acromioclavicular joint space with fibrous overgrowth. There is also mild anterior sloping of the acromion. Otherwise bony structures of the left shoulder unremarkable. The biceps tendon is intact. The glenoid labrum is intact. The supraspinatus muscle as well as tendon are intact. The infraspinatus muscle as well as tendon are intact. The teres minor muscle as  well as tendon are intact. The subscapularis muscle as well as tendon are intact.     Impression: 1.  Degenerative changes of the left shoulder acromioclavicular joint space with associated fibrous overgrowth. 2.  Mild anterior sloping of the acromion which may be a cause of impingement. 3.  Otherwise unremarkable MR left shoulder. Electronically signed by:  Nilson Fernandez MD  1/10/2018 1:02 PM [facility] Workstation: [id number]    MRI of C-spine does show some foraminal stenosis that is severe at C4-C5 on right and moderate on left.  C5-6 shows moderate stenosis on both sides.        Large Joint Arthrocentesis  Date/Time: 1/19/2018 8:51 AM  Consent given by: patient  Site marked: site marked  Timeout: Immediately prior to procedure a time out was called to verify the correct patient, procedure, equipment, support staff and site/side marked as required   Supporting Documentation  Indications: pain   Procedure Details  Location: shoulder - L subacromial bursa  Preparation: Patient was prepped and draped in the usual sterile fashion  Needle size: 22 G  Approach: posterior  Medications administered: 40 mg triamcinolone acetonide 40 MG/ML; 2 mL lidocaine 1 %  Patient tolerance: patient tolerated the procedure well with no immediate complications            ASSESSMENT:    Diagnoses and all orders for this visit:    Acute pain of left shoulder  -     Large Joint Arthrocentesis    Impingement syndrome, shoulder, left  -     Large Joint Arthrocentesis    Rotator cuff syndrome of left shoulder  -     Large Joint Arthrocentesis    Left cervical radiculopathy          PLAN    Minimal findings on shoulder MRI.  Findings on C-spine MRI could be related to shoulder pain.  Will try another injection in left shoulder SubAcromial space and see how she responds.  However, I recommended she have further eval by Spine service as her MRI of shoulder was not impressive.  We discussed possible shoulder surgery pending response to injection and eval by  spine.    Return if symptoms worsen or fail to improve, for recheck.    Aaron Tan MD

## 2018-01-21 RX ORDER — LIDOCAINE HYDROCHLORIDE 10 MG/ML
2 INJECTION, SOLUTION INFILTRATION; PERINEURAL
Status: COMPLETED | OUTPATIENT
Start: 2018-01-19 | End: 2018-01-19

## 2018-01-21 RX ORDER — TRIAMCINOLONE ACETONIDE 40 MG/ML
40 INJECTION, SUSPENSION INTRA-ARTICULAR; INTRAMUSCULAR
Status: COMPLETED | OUTPATIENT
Start: 2018-01-19 | End: 2018-01-19

## 2018-01-22 DIAGNOSIS — M54.12 LEFT CERVICAL RADICULOPATHY: Primary | ICD-10-CM

## 2018-01-29 ENCOUNTER — OFFICE VISIT (OUTPATIENT)
Dept: PULMONOLOGY | Facility: CLINIC | Age: 48
End: 2018-01-29

## 2018-01-29 VITALS
SYSTOLIC BLOOD PRESSURE: 135 MMHG | OXYGEN SATURATION: 99 % | BODY MASS INDEX: 35.74 KG/M2 | WEIGHT: 194.2 LBS | DIASTOLIC BLOOD PRESSURE: 83 MMHG | HEIGHT: 62 IN | HEART RATE: 82 BPM

## 2018-01-29 DIAGNOSIS — J30.2 CHRONIC SEASONAL ALLERGIC RHINITIS, UNSPECIFIED TRIGGER: ICD-10-CM

## 2018-01-29 DIAGNOSIS — E66.09 CLASS 2 OBESITY DUE TO EXCESS CALORIES WITHOUT SERIOUS COMORBIDITY WITH BODY MASS INDEX (BMI) OF 35.0 TO 35.9 IN ADULT: ICD-10-CM

## 2018-01-29 DIAGNOSIS — J45.30 MILD PERSISTENT ASTHMA WITHOUT COMPLICATION: Primary | ICD-10-CM

## 2018-01-29 PROCEDURE — 99214 OFFICE O/P EST MOD 30 MIN: CPT | Performed by: INTERNAL MEDICINE

## 2018-01-29 NOTE — PROGRESS NOTES
Pulmonary Office Follow-up    Subjective     Lora Steiner is seen today at the office for   Chief Complaint   Patient presents with   • Follow-up     3 month         HPI  Lora Steiner is a 47 y.o. female with a PMH significant for asthma, anemia, and back pain who presents for follow-up of asthma.  She was last seen 10/19/17, at which time her dyspnea was improving on Breo and Incruse, and she had decreased albuterol need. She states she was able to stop the Incruse and has not had any problems since cessation.  Patient does continue on Breo 200 daily, but states she has not needed her albuterol at all.  She does report that she has had issues with her neck due to some cervical disc disease as well as some left shoulder pain related to impingement syndrome.  Patient has been referred to neurosurgeon, but she has not been seen yet.  She does admit to some reflux symptoms despite using Prilosec 40 mg.  Patient is been followed by GI in [place], but she has not been seen recently.  She denies any cough, fevers, wheeze, or chest pain.  She is up-to-date with her flu vaccine.    Patient Active Problem List   Diagnosis   • Abdominal pain   • Abnormal mammogram   • Back pain   • Skin sensation disturbance   • Abnormal radiographic examination   • Otalgia   • Allergic rhinitis   • Moderate persistent asthma without complication   • Chronic cough   • Class 2 obesity due to excess calories without serious comorbidity with body mass index (BMI) of 35.0 to 35.9 in adult   • Left shoulder pain   • Impingement syndrome, shoulder, left   • Rotator cuff syndrome of left shoulder   • Left cervical radiculopathy       Review of Systems  Review of Systems   Constitutional: Negative for fever.   HENT: Negative for congestion and postnasal drip.    Respiratory: Negative for cough, shortness of breath and wheezing.    Cardiovascular: Negative for chest pain and leg swelling.   Gastrointestinal:        Heartburn    Musculoskeletal: Positive for arthralgias and neck pain.     As described in the HPI. Otherwise, remainder of ROS (14 systems) were negative.    Medications, Allergies, Social, and Family Histories reviewed as per EMR.    Objective     Vitals:    01/29/18 1536   BP: 135/83   Pulse: 82   SpO2: 99%     Physical Exam   Constitutional: She is oriented to person, place, and time. Vital signs are normal. She appears well-developed and well-nourished.   HENT:   Head: Normocephalic and atraumatic.   Mouth/Throat: Uvula is midline, oropharynx is clear and moist and mucous membranes are normal.   Mallampati 3   Eyes: Conjunctivae, EOM and lids are normal. Pupils are equal, round, and reactive to light.   Neck: Trachea normal and normal range of motion. No tracheal tenderness present. No thyroid mass present.   Cardiovascular: Normal rate, regular rhythm and normal heart sounds.  PMI is not displaced.  Exam reveals no gallop.    No murmur heard.  Pulmonary/Chest: Effort normal. No respiratory distress. She has no decreased breath sounds. She has no wheezes. She has no rhonchi. She exhibits no tenderness.   Abdominal: Soft. Normal appearance. There is no hepatosplenomegaly.   Musculoskeletal:   Normal gait, no extremity edema       Vascular Status -  Her exam exhibits right foot vasculature normal. Her exam exhibits no right foot edema. Her exam exhibits left foot vasculature normal. Her exam exhibits no left foot edema.  Lymphadenopathy:        Head (right side): No submandibular adenopathy present.        Head (left side): No submandibular adenopathy present.     She has no cervical adenopathy.        Right: No supraclavicular adenopathy present.        Left: No supraclavicular adenopathy present.   Neurological: She is alert and oriented to person, place, and time.   Skin: Skin is warm and dry. No cyanosis. Nails show no clubbing.   Psychiatric: She has a normal mood and affect. Her speech is normal and behavior is normal.  Judgment normal.   Nursing note and vitals reviewed.          Assessment/Plan     Lora was seen today for follow-up.    Diagnoses and all orders for this visit:    Mild persistent asthma without complication  -     Fluticasone Furoate-Vilanterol 100-25 MCG/INH aerosol powder ; Inhale 1 puff Daily.    Chronic seasonal allergic rhinitis, unspecified trigger    Class 2 obesity due to excess calories without serious comorbidity with body mass index (BMI) of 35.0 to 35.9 in adult         Discussion/ Recommendations:   She is doing better and was able to de-escalate down to only Breo.  Given that she is not having any symptoms I think we should de-escalate further, but I hesitate to be too aggressive given that allergy season is around the corner.  I do think that she warrants a repeat evaluation by GI given persistent reflux symptoms, but she is already established with Dr. Patterson in [place].    -Finish current Breo 200 inhaler, then changed to Breo 100 daily.  If she notes worsening of her breathing with the change she should go back to the Breo 200 daily.  -Albuterol as needed.  -Continue Singulair and Flonase.  -Up to date with flu vaccine.  -Trigger avoidance.  -Continue Prilosec 40 mg daily.  Recommended that she follow-up with Dr. Patterson regarding persistent heartburn symptoms.    Return in about 3 months (around 4/29/2018) for Recheck.          This document has been electronically signed by Clau Barlow MD on January 29, 2018 3:54 PM      Dragon dictation used

## 2018-02-27 ENCOUNTER — OFFICE VISIT (OUTPATIENT)
Dept: ORTHOPEDIC SURGERY | Facility: CLINIC | Age: 48
End: 2018-02-27

## 2018-02-27 VITALS — BODY MASS INDEX: 33.66 KG/M2 | WEIGHT: 190 LBS | HEIGHT: 63 IN

## 2018-02-27 DIAGNOSIS — M75.42 IMPINGEMENT SYNDROME, SHOULDER, LEFT: Primary | ICD-10-CM

## 2018-02-27 DIAGNOSIS — J45.40 MODERATE PERSISTENT ASTHMA WITHOUT COMPLICATION: ICD-10-CM

## 2018-02-27 DIAGNOSIS — M25.512 ACUTE PAIN OF LEFT SHOULDER: ICD-10-CM

## 2018-02-27 DIAGNOSIS — M54.12 LEFT CERVICAL RADICULOPATHY: ICD-10-CM

## 2018-02-27 DIAGNOSIS — M75.102 ROTATOR CUFF SYNDROME OF LEFT SHOULDER: ICD-10-CM

## 2018-02-27 PROCEDURE — 99214 OFFICE O/P EST MOD 30 MIN: CPT | Performed by: ORTHOPAEDIC SURGERY

## 2018-02-27 NOTE — PROGRESS NOTES
Lora Steiner is a 47 y.o. female returns for     Chief Complaint   Patient presents with   • Left Shoulder - Follow-up       HISTORY OF PRESENT ILLNESS:   Patient is here for recheck of LT shoulder. Patient received intra articular injection during her last office visit. Patient states that the injection did not provide any relief. Patient states she has also attended a consult with a Neurologist, Dr. Taylor since her last office visit. Notes from the consult are available for review.   Did PT for about 3 months without significant improvement  Still doing HEP  Has been taking meloxicam and gabapentin.         CONCURRENT MEDICAL HISTORY:    History reviewed. No pertinent past medical history.    No Known Allergies      Current Outpatient Prescriptions:   •  albuterol (VENTOLIN HFA) 108 (90 BASE) MCG/ACT inhaler, Inhale 2 puffs Every 4 (Four) Hours As Needed for Wheezing or Shortness of Air., Disp: 1 inhaler, Rfl: 11  •  Fluticasone Furoate-Vilanterol 100-25 MCG/INH aerosol powder , Inhale 1 puff Daily., Disp: 1 each, Rfl: 11  •  gabapentin (NEURONTIN) 100 MG capsule, Take 100 mg by mouth 3 (Three) Times a Day., Disp: , Rfl:   •  levonorgestrel (MIRENA) 20 MCG/24HR IUD, 1 each by Intrauterine route., Disp: , Rfl:   •  meloxicam (MOBIC) 15 MG tablet, TK 1 T PO D WF, Disp: , Rfl: 0  •  montelukast (SINGULAIR) 10 MG tablet, TAKE 1 TABLET BY MOUTH EVERY NIGHT AT BEDTIME, Disp: 90 tablet, Rfl: 4  •  OMEPRAZOLE PO, Take  by mouth., Disp: , Rfl:   •  ZOLMitriptan (ZOMIG) 5 MG tablet, 1 Po PRN headache, can repeat in 2 hrs if headache recurrence., Disp: 30 tablet, Rfl: 1    Past Surgical History:   Procedure Laterality Date   • VAGINAL DELIVERY      hx of 3 spontaneous abortions in 1995,1996,2002     Family History   Problem Relation Age of Onset   • Hypertension Father    • Asthma Other    • Cancer Other    • Diabetes Other    • Kidney disease Other    • Migraines Other      Social History   Substance Use Topics   •  "Smoking status: Never Smoker   • Smokeless tobacco: Never Used   • Alcohol use No         ROS  No fevers or chills.  No chest pain or shortness of air.  No GI or  disturbances.  All other systems reviewed as negative    PHYSICAL EXAMINATION:       Ht 160 cm (63\")  Wt 86.2 kg (190 lb)  BMI 33.66 kg/m2    Physical Exam   Constitutional: She is oriented to person, place, and time. She appears well-developed and well-nourished. No distress.   Cardiovascular: Normal rate, regular rhythm and normal heart sounds.    Pulmonary/Chest: Effort normal and breath sounds normal.   Abdominal: Soft. Bowel sounds are normal.   Neurological: She is alert and oriented to person, place, and time.   Psychiatric: She has a normal mood and affect. Her behavior is normal. Judgment and thought content normal.   Vitals reviewed.      GAIT:     [x]  Normal  []  Antalgic    Assistive device: [x]  None  []  Walker     []  Crutches  []  Cane     []  Wheelchair  []  Stretcher    Right Shoulder Exam     Tenderness   The patient is experiencing no tenderness.        Range of Motion   The patient has normal right shoulder ROM.    Muscle Strength   The patient has normal right shoulder strength.    Tests   Hawkin's test: negative    Other   Erythema: absent  Sensation: normal  Pulse: present      Left Shoulder Exam     Range of Motion   Active Abduction: 90 (140 with assistance)   Forward Flexion: 110 (140 with assistance)   Internal Rotation 0 degrees: L5     Muscle Strength   Abduction: 4/5   Supraspinatus: 4/5     Tests   Cross Arm: positive  Hawkin's test: positive  Impingement: positive    Other   Erythema: absent  Sensation: normal  Pulse: present     Comments:  +empty can test.                Xr Shoulder 2+ View Left     Result Date: 1/2/2018  Narrative: 3 views of the left shoulder show acceptable position and alignment of the glenohumeral joint with no acute bony abnormality.  There is moderate arthritic change at the acromioclavicular " joint.  No fracture or dislocation is noted. 01/02/18 at 5:27 PM by Aaron Tan MD       Mri Shoulder Left Wo Contrast     Result Date: 1/10/2018  Narrative: Procedure: MR left shoulder without contrast Reason for exam: Left shoulder pain. Impingement syndrome of left shoulder. FINDINGS: Multisequence multiplanar MR imaging of the left shoulder was performed without contrast. Bony structures of the left shoulder reveal degenerative changes of the acromioclavicular joint space with fibrous overgrowth. There is also mild anterior sloping of the acromion. Otherwise bony structures of the left shoulder unremarkable. The biceps tendon is intact. The glenoid labrum is intact. The supraspinatus muscle as well as tendon are intact. The infraspinatus muscle as well as tendon are intact. The teres minor muscle as well as tendon are intact. The subscapularis muscle as well as tendon are intact.      Impression: 1.  Degenerative changes of the left shoulder acromioclavicular joint space with associated fibrous overgrowth. 2.  Mild anterior sloping of the acromion which may be a cause of impingement. 3.  Otherwise unremarkable MR left shoulder. Electronically signed by:  Nilson Fernandez MD  1/10/2018 1:02 PM CST Workstation: [id number]     MRI of C-spine does show some foraminal stenosis that is severe at C4-C5 on right and moderate on left.  C5-6 shows moderate stenosis on both sides.          ASSESSMENT:     Diagnoses and all orders for this visit:    Impingement syndrome, shoulder, left  -     Case Request; Standing  -     ceFAZolin (ANCEF) 2 g in sodium chloride 0.9 % 100 mL IVPB; Infuse 2 g into a venous catheter 1 (One) Time.  -     Case Request    Acute pain of left shoulder  -     Case Request; Standing  -     ceFAZolin (ANCEF) 2 g in sodium chloride 0.9 % 100 mL IVPB; Infuse 2 g into a venous catheter 1 (One) Time.  -     Case Request    Rotator cuff syndrome of left shoulder  -     Case Request; Standing  -      ceFAZolin (ANCEF) 2 g in sodium chloride 0.9 % 100 mL IVPB; Infuse 2 g into a venous catheter 1 (One) Time.  -     Case Request    Left cervical radiculopathy  -     Case Request; Standing  -     ceFAZolin (ANCEF) 2 g in sodium chloride 0.9 % 100 mL IVPB; Infuse 2 g into a venous catheter 1 (One) Time.  -     Case Request    Moderate persistent asthma without complication  -     Case Request; Standing  -     ceFAZolin (ANCEF) 2 g in sodium chloride 0.9 % 100 mL IVPB; Infuse 2 g into a venous catheter 1 (One) Time.  -     Case Request    Other orders  -     Follow Anesthesia Guidelines / Standing Orders; Future  -     Provide instructions to patient regarding NPO status  -     Follow Anesthesia Guidelines / Standing Orders; Standing  -     Verify NPO Status; Standing  -     CANDACE hose- To be placed on patient in pre-op; Standing  -     POC Glucose Once; Standing  -     Clip operative site; Standing  -     Obtain informed consent (if not collected inpatient or PAT); Standing          PLAN    Patient's BMI is above normal parameters. Follow-up plan includes:  exercise counseling and nutrition counseling.    The patient voiced understanding of the risks, benefits, and alternative forms of treatment that were discussed and the patient consents to proceed with surgery.  All risks, benefits and alternatives were discussed.  Risks including to but not exclusive to anesthetic complications, including death, MI, CVA, infection, bleeding DVT, fracture, residual pain and need for future surgery.  This discussion was held with the patient by Aaron Tan MD and all questions were answered.    Plan arthroscopy of the left shoulder with subacromial decompression and Concha procedure.    We had a long discussion regarding the possibility that a significant portion of her pain could be coming from the cervical spine.  She has seen a neurosurgeon who does not feel as if she has nerve root impingement.  Her physical exam tests seem  to indicate that she has impingement in the shoulder and has weakness in the supraspinatus.  However, she got no improvement with a subacromial injection.  Patient understands this possibility and wishes to proceed.    Return for Post-operative eval.    Aaron Tan MD

## 2018-03-01 PROBLEM — M25.512 ACUTE PAIN OF LEFT SHOULDER: Status: ACTIVE | Noted: 2018-03-01

## 2018-03-08 ENCOUNTER — TELEPHONE (OUTPATIENT)
Dept: ORTHOPEDIC SURGERY | Facility: CLINIC | Age: 48
End: 2018-03-08

## 2018-03-08 NOTE — TELEPHONE ENCOUNTER
Returning a call in regards to shingles.    Patient states they are on the opposite shoulder. She just started medication yesterday.  We will post pone surgery for now.  She will call when the rash has cleared.    AKIN

## 2018-03-09 ENCOUNTER — APPOINTMENT (OUTPATIENT)
Dept: PREADMISSION TESTING | Facility: HOSPITAL | Age: 48
End: 2018-03-09

## 2018-04-02 ENCOUNTER — TELEPHONE (OUTPATIENT)
Dept: ORTHOPEDIC SURGERY | Facility: CLINIC | Age: 48
End: 2018-04-02

## 2018-04-06 ENCOUNTER — APPOINTMENT (OUTPATIENT)
Dept: PREADMISSION TESTING | Facility: HOSPITAL | Age: 48
End: 2018-04-06

## 2018-04-06 ENCOUNTER — OFFICE VISIT (OUTPATIENT)
Dept: ORTHOPEDIC SURGERY | Facility: CLINIC | Age: 48
End: 2018-04-06

## 2018-04-06 VITALS
HEART RATE: 86 BPM | SYSTOLIC BLOOD PRESSURE: 136 MMHG | HEIGHT: 62 IN | WEIGHT: 192 LBS | BODY MASS INDEX: 35.33 KG/M2 | RESPIRATION RATE: 14 BRPM | OXYGEN SATURATION: 98 % | DIASTOLIC BLOOD PRESSURE: 82 MMHG

## 2018-04-06 VITALS — WEIGHT: 193.5 LBS | BODY MASS INDEX: 34.29 KG/M2 | HEIGHT: 63 IN

## 2018-04-06 DIAGNOSIS — M75.102 ROTATOR CUFF SYNDROME OF LEFT SHOULDER: ICD-10-CM

## 2018-04-06 DIAGNOSIS — M75.42 IMPINGEMENT SYNDROME, SHOULDER, LEFT: ICD-10-CM

## 2018-04-06 DIAGNOSIS — M25.512 ACUTE PAIN OF LEFT SHOULDER: Primary | ICD-10-CM

## 2018-04-06 PROBLEM — E61.1 LOW IRON: Status: ACTIVE | Noted: 2018-03-01

## 2018-04-06 PROBLEM — M54.2 NECK PAIN: Status: ACTIVE | Noted: 2018-02-05

## 2018-04-06 PROBLEM — R42 DIZZY SPELLS: Status: ACTIVE | Noted: 2018-03-01

## 2018-04-06 PROBLEM — H69.83 DYSFUNCTION OF BOTH EUSTACHIAN TUBES: Status: ACTIVE | Noted: 2018-03-01

## 2018-04-06 PROCEDURE — 99214 OFFICE O/P EST MOD 30 MIN: CPT | Performed by: NURSE PRACTITIONER

## 2018-04-06 RX ORDER — MONTELUKAST SODIUM 10 MG/1
10 TABLET ORAL DAILY PRN
COMMUNITY
End: 2019-03-18 | Stop reason: SDUPTHER

## 2018-04-06 RX ORDER — FLUTICASONE PROPIONATE 50 MCG
SPRAY, SUSPENSION (ML) NASAL
Status: ON HOLD | COMMUNITY
Start: 2018-04-02 | End: 2018-04-13 | Stop reason: SDUPTHER

## 2018-04-06 RX ORDER — TRAMADOL HYDROCHLORIDE 50 MG/1
TABLET ORAL
COMMUNITY
Start: 2018-03-09 | End: 2018-04-06

## 2018-04-06 RX ORDER — FLUTICASONE PROPIONATE 50 MCG
2 SPRAY, SUSPENSION (ML) NASAL DAILY
COMMUNITY
End: 2018-11-27 | Stop reason: SDUPTHER

## 2018-04-06 RX ORDER — ERGOCALCIFEROL 1.25 MG/1
50000 CAPSULE ORAL WEEKLY
COMMUNITY
Start: 2018-03-06 | End: 2018-11-02 | Stop reason: SDUPTHER

## 2018-04-06 RX ORDER — LIDOCAINE 50 MG/G
PATCH TOPICAL
COMMUNITY
Start: 2018-03-12 | End: 2018-04-06

## 2018-04-06 RX ORDER — MELOXICAM 15 MG/1
15 TABLET ORAL DAILY
COMMUNITY
End: 2018-04-24

## 2018-04-06 RX ORDER — SODIUM CHLORIDE, SODIUM GLUCONATE, SODIUM ACETATE, POTASSIUM CHLORIDE, AND MAGNESIUM CHLORIDE 526; 502; 368; 37; 30 MG/100ML; MG/100ML; MG/100ML; MG/100ML; MG/100ML
1000 INJECTION, SOLUTION INTRAVENOUS CONTINUOUS PRN
Status: CANCELLED | OUTPATIENT
Start: 2018-04-13

## 2018-04-06 NOTE — PROGRESS NOTES
Lora Steiner is a 47 y.o. female returns for     Chief Complaint   Patient presents with   • Left Shoulder - Pre-op Exam       HISTORY OF PRESENT ILLNESS: Patient presents to office for follow up of chronic left shoulder pain and preoperative evaluation. sharda for Left shoulder scope with Dr Tan 4/13/818. Patient continues to have left shoulder pain and limited range of motion that has been present for several months and not improved with multiple conservative treatment efforts including subacromial injections x2, oral NSAIDs (Meloxicam), physical therapy for 3 months, rest and activity modification. Patient wants to proceed with surgical intervention. Scheduled for Left shoulder arthroscopy with subacromial decompression and Concha procedure per Dr. Tan on 4/13/2018.     CONCURRENT MEDICAL HISTORY:    No past medical history on file.    No Known Allergies      Current Outpatient Prescriptions:   •  albuterol (VENTOLIN HFA) 108 (90 BASE) MCG/ACT inhaler, Inhale 2 puffs Every 4 (Four) Hours As Needed for Wheezing or Shortness of Air., Disp: 1 inhaler, Rfl: 11  •  ergocalciferol (ERGOCALCIFEROL) 47821 units capsule, Take 50,000 Units by mouth., Disp: , Rfl:   •  fluticasone (FLONASE) 50 MCG/ACT nasal spray, SHAKE LIQUID AND USE 1 TO 2 SPRAYS IN EACH NOSTRIL EVERY DAY, Disp: , Rfl:   •  Fluticasone Furoate-Vilanterol 100-25 MCG/INH aerosol powder , Inhale 1 puff Daily., Disp: 1 each, Rfl: 11  •  gabapentin (NEURONTIN) 100 MG capsule, Take 100 mg by mouth 3 (Three) Times a Day., Disp: , Rfl:   •  levonorgestrel (MIRENA) 20 MCG/24HR IUD, 1 each by Intrauterine route., Disp: , Rfl:   •  meloxicam (MOBIC) 15 MG tablet, TK 1 T PO D WF, Disp: , Rfl: 0  •  montelukast (SINGULAIR) 10 MG tablet, TAKE 1 TABLET BY MOUTH EVERY NIGHT AT BEDTIME, Disp: 90 tablet, Rfl: 4  •  OMEPRAZOLE PO, Take  by mouth., Disp: , Rfl:   •  ZOLMitriptan (ZOMIG) 5 MG tablet, 1 Po PRN headache, can repeat in 2 hrs if headache recurrence., Disp:  "30 tablet, Rfl: 1    Past Surgical History:   Procedure Laterality Date   • VAGINAL DELIVERY      hx of 3 spontaneous abortions in 1995,1996,2002       ROS  No fevers or chills.  No chest pain or shortness of air.  No GI or  disturbances. Left shoulder pain.     PHYSICAL EXAMINATION:       Ht 160 cm (63\")   Wt 87.8 kg (193 lb 8 oz)   BMI 34.28 kg/m²     Physical Exam   Constitutional: She is oriented to person, place, and time. Vital signs are normal. She appears well-developed and well-nourished.   HENT:   Head: Normocephalic.   Cardiovascular: Normal heart sounds.    Pulmonary/Chest: Effort normal and breath sounds normal. No respiratory distress.   Abdominal: Soft. She exhibits no distension.   Neurological: She is alert and oriented to person, place, and time. GCS eye subscore is 4. GCS verbal subscore is 5. GCS motor subscore is 6.   Skin: Skin is warm, dry and intact. Capillary refill takes less than 2 seconds.   Psychiatric: She has a normal mood and affect. Her speech is normal and behavior is normal. Judgment and thought content normal. Cognition and memory are normal.   Vitals reviewed.      GAIT:     [x]  Normal  []  Antalgic    Assistive device: [x]  None  []  Walker     []  Crutches  []  Cane     []  Wheelchair  []  Stretcher    Right Shoulder Exam     Tenderness   The patient is experiencing no tenderness.        Range of Motion   The patient has normal right shoulder ROM.    Muscle Strength   The patient has normal right shoulder strength.    Tests   Cross Arm: negative  Drop Arm: negative  Hawkin's test: negative  Impingement: negative    Other   Erythema: absent  Sensation: normal  Pulse: present      Left Shoulder Exam     Tenderness   The patient is experiencing tenderness in the acromioclavicular joint and acromion.    Range of Motion   Active Abduction: 100   Passive Abduction: 150   Extension: abnormal   Forward Flexion: 120   External Rotation: abnormal   Internal Rotation 90 degrees: " abnormal     Muscle Strength   Abduction: 4/5   Supraspinatus: 4/5     Tests   Cross Arm: positive  Drop Arm: negative  Hawkin's test: positive  Impingement: positive    Other   Erythema: absent  Sensation: normal  Pulse: present     Comments:  Pain and limitations with range of motion. Empty can test positive.             Study Result     Procedure: MR left shoulder without contrast     Reason for exam: Left shoulder pain. Impingement syndrome of left  shoulder.     FINDINGS: Multisequence multiplanar MR imaging of the left  shoulder was performed without contrast. Bony structures of the  left shoulder reveal degenerative changes of the  acromioclavicular joint space with fibrous overgrowth. There is  also mild anterior sloping of the acromion. Otherwise bony  structures of the left shoulder unremarkable. The biceps tendon  is intact. The glenoid labrum is intact. The supraspinatus muscle  as well as tendon are intact. The infraspinatus muscle as well as  tendon are intact. The teres minor muscle as well as tendon are  intact. The subscapularis muscle as well as tendon are intact.     IMPRESSION:  1.  Degenerative changes of the left shoulder acromioclavicular  joint space with associated fibrous overgrowth.  2.  Mild anterior sloping of the acromion which may be a cause of  impingement.  3.  Otherwise unremarkable MR left shoulder.     Electronically signed by:  Nilson Fernandez MD  1/10/2018 1:02 PM [facility]  Workstation: [id number]       Study Result     3 views of the left shoulder show acceptable position and alignment of the glenohumeral joint with no acute bony abnormality.  There is moderate arthritic change at the acromioclavicular joint.  No fracture or dislocation is noted.  01/02/18 at 5:27 PM by Aaron Tan MD           ASSESSMENT:    Diagnoses and all orders for this visit:    Acute pain of left shoulder    Rotator cuff syndrome of left shoulder    Impingement syndrome, shoulder, left    PLAN    The  patient voiced understanding of the risks, benefits, and alternative forms of treatment that were discussed and the patient consents to proceed with surgery.  All risks, benefits and alternatives were discussed.  Risks including to but not exclusive to anesthetic complications, including death, MI, CVA, infection, bleeding DVT, fracture, residual pain and need for future surgery. All questions were answered.     Patient had recent shingles to the right shoulder and torso that initially delayed her surgery.  Shingles have now resolved and patient is no longer having any pain or taking any anti-viral medication.  Patient is instructed to stop her Meloxicam 5 days prior to surgery.  Patient verbalizes understanding.    Scheduled for left shoulder arthroscopy with subacromial decompression and Concha procedure on 4/13/2018 per Dr. Tan.    Return postoperatively.      This document has been electronically signed by ANA LAURA Jones on April 6, 2018 3:56 PM      ANA LAURA Jones

## 2018-04-12 ENCOUNTER — ANESTHESIA EVENT (OUTPATIENT)
Dept: PERIOP | Facility: HOSPITAL | Age: 48
End: 2018-04-12

## 2018-04-13 ENCOUNTER — APPOINTMENT (OUTPATIENT)
Dept: GENERAL RADIOLOGY | Facility: HOSPITAL | Age: 48
End: 2018-04-13

## 2018-04-13 ENCOUNTER — HOSPITAL ENCOUNTER (OUTPATIENT)
Facility: HOSPITAL | Age: 48
Discharge: HOME | End: 2018-04-14
Attending: ORTHOPAEDIC SURGERY | Admitting: INTERNAL MEDICINE

## 2018-04-13 ENCOUNTER — ANESTHESIA (OUTPATIENT)
Dept: PERIOP | Facility: HOSPITAL | Age: 48
End: 2018-04-13

## 2018-04-13 DIAGNOSIS — E66.09 CLASS 2 OBESITY DUE TO EXCESS CALORIES WITHOUT SERIOUS COMORBIDITY WITH BODY MASS INDEX (BMI) OF 35.0 TO 35.9 IN ADULT: ICD-10-CM

## 2018-04-13 DIAGNOSIS — J45.40 MODERATE PERSISTENT ASTHMA WITHOUT COMPLICATION: ICD-10-CM

## 2018-04-13 DIAGNOSIS — M75.42 IMPINGEMENT SYNDROME, SHOULDER, LEFT: ICD-10-CM

## 2018-04-13 DIAGNOSIS — M54.12 LEFT CERVICAL RADICULOPATHY: ICD-10-CM

## 2018-04-13 DIAGNOSIS — M75.102 ROTATOR CUFF SYNDROME OF LEFT SHOULDER: Primary | ICD-10-CM

## 2018-04-13 DIAGNOSIS — M25.512 ACUTE PAIN OF LEFT SHOULDER: ICD-10-CM

## 2018-04-13 LAB
ANION GAP SERPL CALCULATED.3IONS-SCNC: 15 MMOL/L (ref 5–15)
B-HCG UR QL: NEGATIVE
BUN BLD-MCNC: 10 MG/DL (ref 7–21)
BUN/CREAT SERPL: 16.4 (ref 7–25)
CALCIUM SPEC-SCNC: 8.3 MG/DL (ref 8.4–10.2)
CHLORIDE SERPL-SCNC: 99 MMOL/L (ref 95–110)
CO2 SERPL-SCNC: 22 MMOL/L (ref 22–31)
CREAT BLD-MCNC: 0.61 MG/DL (ref 0.5–1)
DEPRECATED RDW RBC AUTO: 41.3 FL (ref 36.4–46.3)
ERYTHROCYTE [DISTWIDTH] IN BLOOD BY AUTOMATED COUNT: 14.6 % (ref 11.5–14.5)
GFR SERPL CREATININE-BSD FRML MDRD: 105 ML/MIN/1.73 (ref 58–135)
GLUCOSE BLD-MCNC: 137 MG/DL (ref 60–100)
HCT VFR BLD AUTO: 38.5 % (ref 35–45)
HGB BLD-MCNC: 13 G/DL (ref 12–15.5)
MCH RBC QN AUTO: 26 PG (ref 26.5–34)
MCHC RBC AUTO-ENTMCNC: 33.8 G/DL (ref 31.4–36)
MCV RBC AUTO: 77 FL (ref 80–98)
PLATELET # BLD AUTO: 287 10*3/MM3 (ref 150–450)
PMV BLD AUTO: 9 FL (ref 8–12)
POTASSIUM BLD-SCNC: 3.1 MMOL/L (ref 3.5–5.1)
RBC # BLD AUTO: 5 10*6/MM3 (ref 3.77–5.16)
SODIUM BLD-SCNC: 136 MMOL/L (ref 137–145)
WBC NRBC COR # BLD: 15.04 10*3/MM3 (ref 3.2–9.8)

## 2018-04-13 PROCEDURE — 81025 URINE PREGNANCY TEST: CPT | Performed by: ANESTHESIOLOGY

## 2018-04-13 PROCEDURE — 84132 ASSAY OF SERUM POTASSIUM: CPT | Performed by: FAMILY MEDICINE

## 2018-04-13 PROCEDURE — 80048 BASIC METABOLIC PNL TOTAL CA: CPT | Performed by: INTERNAL MEDICINE

## 2018-04-13 PROCEDURE — 25010000002 SUCCINYLCHOLINE PER 20 MG: Performed by: NURSE ANESTHETIST, CERTIFIED REGISTERED

## 2018-04-13 PROCEDURE — 25010000002 MIDAZOLAM PER 1 MG: Performed by: NURSE ANESTHETIST, CERTIFIED REGISTERED

## 2018-04-13 PROCEDURE — 25010000002 FUROSEMIDE PER 20 MG: Performed by: INTERNAL MEDICINE

## 2018-04-13 PROCEDURE — 29824 SHO ARTHRS SRG DSTL CLAVICLC: CPT | Performed by: ORTHOPAEDIC SURGERY

## 2018-04-13 PROCEDURE — 85027 COMPLETE CBC AUTOMATED: CPT | Performed by: INTERNAL MEDICINE

## 2018-04-13 PROCEDURE — 25010000002 EPINEPHRINE PER 0.1 MG: Performed by: ORTHOPAEDIC SURGERY

## 2018-04-13 PROCEDURE — 25010000002 FENTANYL CITRATE (PF) 100 MCG/2ML SOLUTION: Performed by: NURSE ANESTHETIST, CERTIFIED REGISTERED

## 2018-04-13 PROCEDURE — 94640 AIRWAY INHALATION TREATMENT: CPT

## 2018-04-13 PROCEDURE — 25010000002 LEVOFLOXACIN PER 250 MG: Performed by: FAMILY MEDICINE

## 2018-04-13 PROCEDURE — 25010000002 HYDROMORPHONE PER 4 MG: Performed by: NURSE ANESTHETIST, CERTIFIED REGISTERED

## 2018-04-13 PROCEDURE — 25010000003 CEFAZOLIN PER 500 MG: Performed by: ORTHOPAEDIC SURGERY

## 2018-04-13 PROCEDURE — 25010000002 PROPOFOL 10 MG/ML EMULSION: Performed by: NURSE ANESTHETIST, CERTIFIED REGISTERED

## 2018-04-13 PROCEDURE — 29826 SHO ARTHRS SRG DECOMPRESSION: CPT | Performed by: ORTHOPAEDIC SURGERY

## 2018-04-13 PROCEDURE — 25010000002 DEXAMETHASONE PER 1 MG: Performed by: NURSE ANESTHETIST, CERTIFIED REGISTERED

## 2018-04-13 PROCEDURE — 71045 X-RAY EXAM CHEST 1 VIEW: CPT

## 2018-04-13 PROCEDURE — 83735 ASSAY OF MAGNESIUM: CPT | Performed by: FAMILY MEDICINE

## 2018-04-13 PROCEDURE — 25010000002 ONDANSETRON PER 1 MG: Performed by: NURSE ANESTHETIST, CERTIFIED REGISTERED

## 2018-04-13 RX ORDER — FLUMAZENIL 0.1 MG/ML
0.2 INJECTION INTRAVENOUS AS NEEDED
Status: DISCONTINUED | OUTPATIENT
Start: 2018-04-13 | End: 2018-04-13 | Stop reason: HOSPADM

## 2018-04-13 RX ORDER — OXYCODONE AND ACETAMINOPHEN 7.5; 325 MG/1; MG/1
1 TABLET ORAL EVERY 4 HOURS PRN
Status: DISCONTINUED | OUTPATIENT
Start: 2018-04-13 | End: 2018-04-14 | Stop reason: HOSPADM

## 2018-04-13 RX ORDER — MEPERIDINE HYDROCHLORIDE 50 MG/ML
12.5 INJECTION INTRAMUSCULAR; INTRAVENOUS; SUBCUTANEOUS
Status: DISCONTINUED | OUTPATIENT
Start: 2018-04-13 | End: 2018-04-13 | Stop reason: HOSPADM

## 2018-04-13 RX ORDER — MIDAZOLAM HYDROCHLORIDE 1 MG/ML
INJECTION INTRAMUSCULAR; INTRAVENOUS AS NEEDED
Status: DISCONTINUED | OUTPATIENT
Start: 2018-04-13 | End: 2018-04-13 | Stop reason: SURG

## 2018-04-13 RX ORDER — NALOXONE HCL 0.4 MG/ML
0.2 VIAL (ML) INJECTION AS NEEDED
Status: DISCONTINUED | OUTPATIENT
Start: 2018-04-13 | End: 2018-04-13 | Stop reason: HOSPADM

## 2018-04-13 RX ORDER — ONDANSETRON 2 MG/ML
4 INJECTION INTRAMUSCULAR; INTRAVENOUS EVERY 6 HOURS PRN
Status: DISCONTINUED | OUTPATIENT
Start: 2018-04-13 | End: 2018-04-14 | Stop reason: HOSPADM

## 2018-04-13 RX ORDER — ALBUTEROL SULFATE 90 UG/1
1 AEROSOL, METERED RESPIRATORY (INHALATION) EVERY 4 HOURS PRN
Status: DISCONTINUED | OUTPATIENT
Start: 2018-04-13 | End: 2018-04-14 | Stop reason: HOSPADM

## 2018-04-13 RX ORDER — LABETALOL HYDROCHLORIDE 5 MG/ML
5 INJECTION, SOLUTION INTRAVENOUS
Status: DISCONTINUED | OUTPATIENT
Start: 2018-04-13 | End: 2018-04-13 | Stop reason: HOSPADM

## 2018-04-13 RX ORDER — SUCCINYLCHOLINE CHLORIDE 20 MG/ML
INJECTION INTRAMUSCULAR; INTRAVENOUS AS NEEDED
Status: DISCONTINUED | OUTPATIENT
Start: 2018-04-13 | End: 2018-04-13 | Stop reason: SURG

## 2018-04-13 RX ORDER — POTASSIUM CHLORIDE 7.45 MG/ML
10 INJECTION INTRAVENOUS
Status: DISCONTINUED | OUTPATIENT
Start: 2018-04-13 | End: 2018-04-14 | Stop reason: HOSPADM

## 2018-04-13 RX ORDER — ONDANSETRON 2 MG/ML
4 INJECTION INTRAMUSCULAR; INTRAVENOUS ONCE AS NEEDED
Status: DISCONTINUED | OUTPATIENT
Start: 2018-04-13 | End: 2018-04-13 | Stop reason: HOSPADM

## 2018-04-13 RX ORDER — FUROSEMIDE 10 MG/ML
40 INJECTION INTRAMUSCULAR; INTRAVENOUS ONCE
Status: COMPLETED | OUTPATIENT
Start: 2018-04-13 | End: 2018-04-13

## 2018-04-13 RX ORDER — GABAPENTIN 100 MG/1
100 CAPSULE ORAL EVERY 12 HOURS SCHEDULED
Status: DISCONTINUED | OUTPATIENT
Start: 2018-04-13 | End: 2018-04-14 | Stop reason: HOSPADM

## 2018-04-13 RX ORDER — DIPHENHYDRAMINE HYDROCHLORIDE 50 MG/ML
12.5 INJECTION INTRAMUSCULAR; INTRAVENOUS
Status: DISCONTINUED | OUTPATIENT
Start: 2018-04-13 | End: 2018-04-13 | Stop reason: HOSPADM

## 2018-04-13 RX ORDER — ACETAMINOPHEN 650 MG/1
650 SUPPOSITORY RECTAL ONCE AS NEEDED
Status: DISCONTINUED | OUTPATIENT
Start: 2018-04-13 | End: 2018-04-13 | Stop reason: HOSPADM

## 2018-04-13 RX ORDER — EPHEDRINE SULFATE 50 MG/ML
5 INJECTION, SOLUTION INTRAVENOUS ONCE AS NEEDED
Status: DISCONTINUED | OUTPATIENT
Start: 2018-04-13 | End: 2018-04-13 | Stop reason: HOSPADM

## 2018-04-13 RX ORDER — FLUTICASONE PROPIONATE 50 MCG
2 SPRAY, SUSPENSION (ML) NASAL DAILY
Status: DISCONTINUED | OUTPATIENT
Start: 2018-04-13 | End: 2018-04-14 | Stop reason: HOSPADM

## 2018-04-13 RX ORDER — MORPHINE SULFATE 2 MG/ML
2 INJECTION, SOLUTION INTRAMUSCULAR; INTRAVENOUS
Status: DISCONTINUED | OUTPATIENT
Start: 2018-04-13 | End: 2018-04-14 | Stop reason: HOSPADM

## 2018-04-13 RX ORDER — FAMOTIDINE 40 MG/1
40 TABLET, FILM COATED ORAL DAILY
Status: DISCONTINUED | OUTPATIENT
Start: 2018-04-13 | End: 2018-04-14 | Stop reason: HOSPADM

## 2018-04-13 RX ORDER — ROCURONIUM BROMIDE 10 MG/ML
INJECTION, SOLUTION INTRAVENOUS AS NEEDED
Status: DISCONTINUED | OUTPATIENT
Start: 2018-04-13 | End: 2018-04-13 | Stop reason: SURG

## 2018-04-13 RX ORDER — ALBUTEROL SULFATE 2.5 MG/3ML
2.5 SOLUTION RESPIRATORY (INHALATION) EVERY 6 HOURS PRN
Status: DISCONTINUED | OUTPATIENT
Start: 2018-04-13 | End: 2018-04-13 | Stop reason: HOSPADM

## 2018-04-13 RX ORDER — ACETAMINOPHEN 325 MG/1
650 TABLET ORAL ONCE AS NEEDED
Status: DISCONTINUED | OUTPATIENT
Start: 2018-04-13 | End: 2018-04-13 | Stop reason: HOSPADM

## 2018-04-13 RX ORDER — POTASSIUM CHLORIDE 1.5 G/1.77G
40 POWDER, FOR SOLUTION ORAL AS NEEDED
Status: DISCONTINUED | OUTPATIENT
Start: 2018-04-13 | End: 2018-04-14 | Stop reason: HOSPADM

## 2018-04-13 RX ORDER — LEVOFLOXACIN 5 MG/ML
500 INJECTION, SOLUTION INTRAVENOUS EVERY 24 HOURS
Status: DISCONTINUED | OUTPATIENT
Start: 2018-04-13 | End: 2018-04-14 | Stop reason: HOSPADM

## 2018-04-13 RX ORDER — MONTELUKAST SODIUM 10 MG/1
10 TABLET ORAL NIGHTLY
Status: DISCONTINUED | OUTPATIENT
Start: 2018-04-13 | End: 2018-04-14 | Stop reason: HOSPADM

## 2018-04-13 RX ORDER — ONDANSETRON 2 MG/ML
4 INJECTION INTRAMUSCULAR; INTRAVENOUS ONCE AS NEEDED
Status: DISCONTINUED | OUTPATIENT
Start: 2018-04-13 | End: 2018-04-14 | Stop reason: HOSPADM

## 2018-04-13 RX ORDER — PROMETHAZINE HYDROCHLORIDE 25 MG/ML
12.5 INJECTION, SOLUTION INTRAMUSCULAR; INTRAVENOUS ONCE AS NEEDED
Status: DISCONTINUED | OUTPATIENT
Start: 2018-04-13 | End: 2018-04-14 | Stop reason: HOSPADM

## 2018-04-13 RX ORDER — SODIUM CHLORIDE 0.9 % (FLUSH) 0.9 %
1-10 SYRINGE (ML) INJECTION AS NEEDED
Status: DISCONTINUED | OUTPATIENT
Start: 2018-04-13 | End: 2018-04-14 | Stop reason: HOSPADM

## 2018-04-13 RX ORDER — SODIUM CHLORIDE, SODIUM GLUCONATE, SODIUM ACETATE, POTASSIUM CHLORIDE, AND MAGNESIUM CHLORIDE 526; 502; 368; 37; 30 MG/100ML; MG/100ML; MG/100ML; MG/100ML; MG/100ML
1000 INJECTION, SOLUTION INTRAVENOUS CONTINUOUS PRN
Status: DISCONTINUED | OUTPATIENT
Start: 2018-04-13 | End: 2018-04-13 | Stop reason: HOSPADM

## 2018-04-13 RX ORDER — OXYCODONE AND ACETAMINOPHEN 7.5; 325 MG/1; MG/1
1 TABLET ORAL EVERY 4 HOURS PRN
Qty: 40 TABLET | Refills: 0 | Status: SHIPPED | OUTPATIENT
Start: 2018-04-13 | End: 2018-04-16 | Stop reason: SDUPTHER

## 2018-04-13 RX ORDER — LIDOCAINE HYDROCHLORIDE 20 MG/ML
INJECTION, SOLUTION INFILTRATION; PERINEURAL AS NEEDED
Status: DISCONTINUED | OUTPATIENT
Start: 2018-04-13 | End: 2018-04-13 | Stop reason: SURG

## 2018-04-13 RX ORDER — SUMATRIPTAN 50 MG/1
50 TABLET, FILM COATED ORAL
Status: DISCONTINUED | OUTPATIENT
Start: 2018-04-13 | End: 2018-04-14 | Stop reason: HOSPADM

## 2018-04-13 RX ORDER — DEXAMETHASONE SODIUM PHOSPHATE 4 MG/ML
INJECTION, SOLUTION INTRA-ARTICULAR; INTRALESIONAL; INTRAMUSCULAR; INTRAVENOUS; SOFT TISSUE AS NEEDED
Status: DISCONTINUED | OUTPATIENT
Start: 2018-04-13 | End: 2018-04-13 | Stop reason: SURG

## 2018-04-13 RX ORDER — POTASSIUM CHLORIDE 750 MG/1
40 CAPSULE, EXTENDED RELEASE ORAL AS NEEDED
Status: DISCONTINUED | OUTPATIENT
Start: 2018-04-13 | End: 2018-04-14 | Stop reason: HOSPADM

## 2018-04-13 RX ORDER — EPINEPHRINE 1 MG/ML
INJECTION, SOLUTION, CONCENTRATE INTRAVENOUS AS NEEDED
Status: DISCONTINUED | OUTPATIENT
Start: 2018-04-13 | End: 2018-04-14 | Stop reason: HOSPADM

## 2018-04-13 RX ORDER — PROPOFOL 10 MG/ML
VIAL (ML) INTRAVENOUS AS NEEDED
Status: DISCONTINUED | OUTPATIENT
Start: 2018-04-13 | End: 2018-04-13 | Stop reason: SURG

## 2018-04-13 RX ORDER — FENTANYL CITRATE 50 UG/ML
INJECTION, SOLUTION INTRAMUSCULAR; INTRAVENOUS AS NEEDED
Status: DISCONTINUED | OUTPATIENT
Start: 2018-04-13 | End: 2018-04-13 | Stop reason: SURG

## 2018-04-13 RX ORDER — ONDANSETRON 2 MG/ML
INJECTION INTRAMUSCULAR; INTRAVENOUS AS NEEDED
Status: DISCONTINUED | OUTPATIENT
Start: 2018-04-13 | End: 2018-04-13 | Stop reason: SURG

## 2018-04-13 RX ORDER — OXYCODONE HYDROCHLORIDE AND ACETAMINOPHEN 5; 325 MG/1; MG/1
1 TABLET ORAL ONCE AS NEEDED
Status: DISCONTINUED | OUTPATIENT
Start: 2018-04-13 | End: 2018-04-13 | Stop reason: HOSPADM

## 2018-04-13 RX ADMIN — ONDANSETRON 4 MG: 2 INJECTION INTRAMUSCULAR; INTRAVENOUS at 15:55

## 2018-04-13 RX ADMIN — SODIUM CHLORIDE, SODIUM GLUCONATE, SODIUM ACETATE, POTASSIUM CHLORIDE, AND MAGNESIUM CHLORIDE 1000 ML: 526; 502; 368; 37; 30 INJECTION, SOLUTION INTRAVENOUS at 13:18

## 2018-04-13 RX ADMIN — DEXAMETHASONE SODIUM PHOSPHATE 4 MG: 4 INJECTION, SOLUTION INTRAMUSCULAR; INTRAVENOUS at 15:55

## 2018-04-13 RX ADMIN — MONTELUKAST SODIUM 10 MG: 10 TABLET, FILM COATED ORAL at 20:04

## 2018-04-13 RX ADMIN — PROPOFOL 50 MG: 10 INJECTION, EMULSION INTRAVENOUS at 15:30

## 2018-04-13 RX ADMIN — OXYCODONE HYDROCHLORIDE AND ACETAMINOPHEN 1 TABLET: 7.5; 325 TABLET ORAL at 20:40

## 2018-04-13 RX ADMIN — ROCURONIUM BROMIDE 5 MG: 10 INJECTION INTRAVENOUS at 14:45

## 2018-04-13 RX ADMIN — LIDOCAINE HYDROCHLORIDE 80 MG: 20 INJECTION, SOLUTION INFILTRATION; PERINEURAL at 14:45

## 2018-04-13 RX ADMIN — FUROSEMIDE 40 MG: 10 INJECTION, SOLUTION INTRAMUSCULAR; INTRAVENOUS at 19:49

## 2018-04-13 RX ADMIN — PROPOFOL 150 MG: 10 INJECTION, EMULSION INTRAVENOUS at 14:45

## 2018-04-13 RX ADMIN — FENTANYL CITRATE 100 MCG: 50 INJECTION, SOLUTION INTRAMUSCULAR; INTRAVENOUS at 14:44

## 2018-04-13 RX ADMIN — FENTANYL CITRATE 100 MCG: 50 INJECTION, SOLUTION INTRAMUSCULAR; INTRAVENOUS at 15:25

## 2018-04-13 RX ADMIN — SODIUM CHLORIDE, SODIUM GLUCONATE, SODIUM ACETATE, POTASSIUM CHLORIDE, AND MAGNESIUM CHLORIDE: 526; 502; 368; 37; 30 INJECTION, SOLUTION INTRAVENOUS at 15:30

## 2018-04-13 RX ADMIN — MIDAZOLAM 2 MG: 1 INJECTION INTRAMUSCULAR; INTRAVENOUS at 14:39

## 2018-04-13 RX ADMIN — SODIUM CHLORIDE 2 G: 9 INJECTION INTRAMUSCULAR; INTRAVENOUS; SUBCUTANEOUS at 15:06

## 2018-04-13 RX ADMIN — HYDROMORPHONE HYDROCHLORIDE 0.5 MG: 1 INJECTION, SOLUTION INTRAMUSCULAR; INTRAVENOUS; SUBCUTANEOUS at 17:31

## 2018-04-13 RX ADMIN — LEVOFLOXACIN 500 MG: 5 INJECTION, SOLUTION INTRAVENOUS at 20:37

## 2018-04-13 RX ADMIN — HYDROMORPHONE HYDROCHLORIDE 0.5 MG: 1 INJECTION, SOLUTION INTRAMUSCULAR; INTRAVENOUS; SUBCUTANEOUS at 18:06

## 2018-04-13 RX ADMIN — FAMOTIDINE 40 MG: 40 TABLET ORAL at 19:50

## 2018-04-13 RX ADMIN — FLUTICASONE PROPIONATE 2 SPRAY: 50 SPRAY, METERED NASAL at 19:50

## 2018-04-13 RX ADMIN — ALBUTEROL SULFATE 2.5 MG: 2.5 SOLUTION RESPIRATORY (INHALATION) at 16:57

## 2018-04-13 RX ADMIN — SODIUM CHLORIDE, SODIUM GLUCONATE, SODIUM ACETATE, POTASSIUM CHLORIDE, AND MAGNESIUM CHLORIDE: 526; 502; 368; 37; 30 INJECTION, SOLUTION INTRAVENOUS at 14:39

## 2018-04-13 RX ADMIN — SUCCINYLCHOLINE CHLORIDE 140 MG: 20 INJECTION, SOLUTION INTRAMUSCULAR; INTRAVENOUS at 14:45

## 2018-04-13 RX ADMIN — GABAPENTIN 100 MG: 100 CAPSULE ORAL at 20:04

## 2018-04-13 RX ADMIN — PROPOFOL 50 MG: 10 INJECTION, EMULSION INTRAVENOUS at 15:40

## 2018-04-13 NOTE — H&P (VIEW-ONLY)
Lora Steiner is a 47 y.o. female returns for     Chief Complaint   Patient presents with   • Left Shoulder - Pre-op Exam       HISTORY OF PRESENT ILLNESS: Patient presents to office for follow up of chronic left shoulder pain and preoperative evaluation. sharda for Left shoulder scope with Dr Tan 4/13/818. Patient continues to have left shoulder pain and limited range of motion that has been present for several months and not improved with multiple conservative treatment efforts including subacromial injections x2, oral NSAIDs (Meloxicam), physical therapy for 3 months, rest and activity modification. Patient wants to proceed with surgical intervention. Scheduled for Left shoulder arthroscopy with subacromial decompression and Concha procedure per Dr. Tan on 4/13/2018.     CONCURRENT MEDICAL HISTORY:    No past medical history on file.    No Known Allergies      Current Outpatient Prescriptions:   •  albuterol (VENTOLIN HFA) 108 (90 BASE) MCG/ACT inhaler, Inhale 2 puffs Every 4 (Four) Hours As Needed for Wheezing or Shortness of Air., Disp: 1 inhaler, Rfl: 11  •  ergocalciferol (ERGOCALCIFEROL) 59138 units capsule, Take 50,000 Units by mouth., Disp: , Rfl:   •  fluticasone (FLONASE) 50 MCG/ACT nasal spray, SHAKE LIQUID AND USE 1 TO 2 SPRAYS IN EACH NOSTRIL EVERY DAY, Disp: , Rfl:   •  Fluticasone Furoate-Vilanterol 100-25 MCG/INH aerosol powder , Inhale 1 puff Daily., Disp: 1 each, Rfl: 11  •  gabapentin (NEURONTIN) 100 MG capsule, Take 100 mg by mouth 3 (Three) Times a Day., Disp: , Rfl:   •  levonorgestrel (MIRENA) 20 MCG/24HR IUD, 1 each by Intrauterine route., Disp: , Rfl:   •  meloxicam (MOBIC) 15 MG tablet, TK 1 T PO D WF, Disp: , Rfl: 0  •  montelukast (SINGULAIR) 10 MG tablet, TAKE 1 TABLET BY MOUTH EVERY NIGHT AT BEDTIME, Disp: 90 tablet, Rfl: 4  •  OMEPRAZOLE PO, Take  by mouth., Disp: , Rfl:   •  ZOLMitriptan (ZOMIG) 5 MG tablet, 1 Po PRN headache, can repeat in 2 hrs if headache recurrence., Disp:  "30 tablet, Rfl: 1    Past Surgical History:   Procedure Laterality Date   • VAGINAL DELIVERY      hx of 3 spontaneous abortions in 1995,1996,2002       ROS  No fevers or chills.  No chest pain or shortness of air.  No GI or  disturbances. Left shoulder pain.     PHYSICAL EXAMINATION:       Ht 160 cm (63\")   Wt 87.8 kg (193 lb 8 oz)   BMI 34.28 kg/m²     Physical Exam   Constitutional: She is oriented to person, place, and time. Vital signs are normal. She appears well-developed and well-nourished.   HENT:   Head: Normocephalic.   Cardiovascular: Normal heart sounds.    Pulmonary/Chest: Effort normal and breath sounds normal. No respiratory distress.   Abdominal: Soft. She exhibits no distension.   Neurological: She is alert and oriented to person, place, and time. GCS eye subscore is 4. GCS verbal subscore is 5. GCS motor subscore is 6.   Skin: Skin is warm, dry and intact. Capillary refill takes less than 2 seconds.   Psychiatric: She has a normal mood and affect. Her speech is normal and behavior is normal. Judgment and thought content normal. Cognition and memory are normal.   Vitals reviewed.      GAIT:     [x]  Normal  []  Antalgic    Assistive device: [x]  None  []  Walker     []  Crutches  []  Cane     []  Wheelchair  []  Stretcher    Right Shoulder Exam     Tenderness   The patient is experiencing no tenderness.        Range of Motion   The patient has normal right shoulder ROM.    Muscle Strength   The patient has normal right shoulder strength.    Tests   Cross Arm: negative  Drop Arm: negative  Hawkin's test: negative  Impingement: negative    Other   Erythema: absent  Sensation: normal  Pulse: present      Left Shoulder Exam     Tenderness   The patient is experiencing tenderness in the acromioclavicular joint and acromion.    Range of Motion   Active Abduction: 100   Passive Abduction: 150   Extension: abnormal   Forward Flexion: 120   External Rotation: abnormal   Internal Rotation 90 degrees: " abnormal     Muscle Strength   Abduction: 4/5   Supraspinatus: 4/5     Tests   Cross Arm: positive  Drop Arm: negative  Hawkin's test: positive  Impingement: positive    Other   Erythema: absent  Sensation: normal  Pulse: present     Comments:  Pain and limitations with range of motion. Empty can test positive.             Study Result     Procedure: MR left shoulder without contrast     Reason for exam: Left shoulder pain. Impingement syndrome of left  shoulder.     FINDINGS: Multisequence multiplanar MR imaging of the left  shoulder was performed without contrast. Bony structures of the  left shoulder reveal degenerative changes of the  acromioclavicular joint space with fibrous overgrowth. There is  also mild anterior sloping of the acromion. Otherwise bony  structures of the left shoulder unremarkable. The biceps tendon  is intact. The glenoid labrum is intact. The supraspinatus muscle  as well as tendon are intact. The infraspinatus muscle as well as  tendon are intact. The teres minor muscle as well as tendon are  intact. The subscapularis muscle as well as tendon are intact.     IMPRESSION:  1.  Degenerative changes of the left shoulder acromioclavicular  joint space with associated fibrous overgrowth.  2.  Mild anterior sloping of the acromion which may be a cause of  impingement.  3.  Otherwise unremarkable MR left shoulder.     Electronically signed by:  Nilson Fernandez MD  1/10/2018 1:02 PM [facility]  Workstation: [id number]       Study Result     3 views of the left shoulder show acceptable position and alignment of the glenohumeral joint with no acute bony abnormality.  There is moderate arthritic change at the acromioclavicular joint.  No fracture or dislocation is noted.  01/02/18 at 5:27 PM by Aaron Tan MD           ASSESSMENT:    Diagnoses and all orders for this visit:    Acute pain of left shoulder    Rotator cuff syndrome of left shoulder    Impingement syndrome, shoulder, left    PLAN    The  patient voiced understanding of the risks, benefits, and alternative forms of treatment that were discussed and the patient consents to proceed with surgery.  All risks, benefits and alternatives were discussed.  Risks including to but not exclusive to anesthetic complications, including death, MI, CVA, infection, bleeding DVT, fracture, residual pain and need for future surgery. All questions were answered.     Patient had recent shingles to the right shoulder and torso that initially delayed her surgery.  Shingles have now resolved and patient is no longer having any pain or taking any anti-viral medication.  Patient is instructed to stop her Meloxicam 5 days prior to surgery.  Patient verbalizes understanding.    Scheduled for left shoulder arthroscopy with subacromial decompression and Concha procedure on 4/13/2018 per Dr. Tan.    Return postoperatively.      This document has been electronically signed by ANA LAURA Jones on April 6, 2018 3:56 PM      ANA LAURA Jones

## 2018-04-13 NOTE — INTERVAL H&P NOTE
H&P reviewed. The patient was examined and there are no changes to the H&P.     The patient voiced understanding of the risks, benefits, and alternative forms of treatment that were discussed and the patient consents to proceed with surgery.  All risks, benefits and alternatives were discussed.  Risks including to but not exclusive to anesthetic complications, including death, MI, CVA, infection, bleeding DVT, fracture, residual pain and need for future surgery.  This discussion was held with the patient by Aaron Tan MD and all questions were answered.

## 2018-04-13 NOTE — ANESTHESIA POSTPROCEDURE EVALUATION
Patient: Lora Steiner    Procedure Summary     Date:  04/13/18 Room / Location:  [facility] OR  / [facility] OR    Anesthesia Start:  1442 Anesthesia Stop:  1626    Procedure:  arthroscopy of the left shoulder with subacromial decompression and Concha procedure. (Left Shoulder) Diagnosis:       Rotator cuff syndrome of left shoulder      Moderate persistent asthma without complication      Left cervical radiculopathy      Impingement syndrome, shoulder, left      Acute pain of left shoulder      (Rotator cuff syndrome of left shoulder [M75.102])      (Moderate persistent asthma without complication [J45.40])      (Left cervical radiculopathy [M54.12])      (Impingement syndrome, shoulder, left [M75.42])      (Acute pain of left shoulder [M25.512])    Surgeon:  Aaron Tan MD Provider:  Corbin Chaney MD    Anesthesia Type:  general ASA Status:  3          Anesthesia Type: general  Last vitals  BP   132/72 (04/13/18 1252)   Temp   97.9 °F (36.6 °C) (04/13/18 1252)   Pulse   77 (04/13/18 1252)   Resp   18 (04/13/18 1252)     SpO2   99 % (04/13/18 1252)     Post Anesthesia Care and Evaluation    Patient location during evaluation: PACU  Patient participation: waiting for patient participation  Level of consciousness: lethargic  Pain score: 0  Pain management: adequate  Airway patency: positional obstruction  Anesthetic complications: No anesthetic complications  PONV Status: none  Cardiovascular status: acceptable  Respiratory status: acceptable  Hydration status: acceptable  Post Neuraxial Block status: Motor and sensory function returned to baseline

## 2018-04-13 NOTE — ANESTHESIA PREPROCEDURE EVALUATION
Anesthesia Evaluation     Patient summary reviewed and Nursing notes reviewed   NPO Solid Status: > 8 hours  NPO Liquid Status: > 8 hours           Airway   Mallampati: II  TM distance: >3 FB  Neck ROM: full  possible difficult intubation  Dental - normal exam     Pulmonary - normal exam    breath sounds clear to auscultation  (+) asthma,   (-) not a smoker  Cardiovascular - negative cardio ROS and normal exam    Rhythm: regular  Rate: normal    (-) murmur      Neuro/Psych  (+) headaches (Migraine.), dizziness/light headedness, numbness (Left cervical radiculopathy.),     GI/Hepatic/Renal/Endo    (+) obesity,  GERD,      Musculoskeletal     (+) back pain,   Abdominal    Substance History - negative use     OB/GYN negative ob/gyn ROS         Other - negative ROS                       Anesthesia Plan    ASA 3     general   (Discussed peripheral nerve block(interscalene) for post op pain relief and patient understands possible complications,risks and agrees.)  intravenous induction   Anesthetic plan and risks discussed with patient and spouse/significant other.

## 2018-04-13 NOTE — DISCHARGE INSTR - APPOINTMENTS
Call physical therapy Monday to schedule appointment for that day.  [facility] 1 WEEK OFFICE WILL CALL WITH APPOINTMENT

## 2018-04-13 NOTE — ANESTHESIA PROCEDURE NOTES
Peripheral Block    Patient location during procedure: holding area  Performed by  Anesthesiologist: BRETT MARQUES  Preanesthetic Checklist  Completed: patient identified, site marked, surgical consent, pre-op evaluation, timeout performed, IV checked, risks and benefits discussed and monitors and equipment checked  Prep:  Pt Position: supine  Sterile barriers:cap, gloves and sterile barriers  Prep: ChloraPrep  Patient monitoring: blood pressure monitoring, continuous pulse oximetry and EKG  Procedure  Sedation:no  Performed under: PNB  Guidance:ultrasound guided  ULTRASOUND INTERPRETATION. Using ultrasound guidance a 22 G gauge needle was placed in close proximity to the nerve, at which point, under ultrasound guidance anesthetic was injected in the area of the nerve and spread of the anesthesia was seen on ultrasound in close proximity thereto.  There were no abnormalities seen on ultrasound; a digital image was taken; and the patient tolerated the procedure with no complications. Images:still images obtained    Block Type:interscalene  Injection Technique:single-shot  Needle Type:echogenic  Needle Gauge:20 G  Resistance on Injection: less than 15 psi  Medications  Local Injected:ropivacaine 0.5% Local Amount Injected:30mL  Post Assessment  Injection Assessment: negative aspiration for heme, no paresthesia on injection and incremental injection  Patient Tolerance:comfortable throughout block  Complications:no

## 2018-04-13 NOTE — ANESTHESIA PROCEDURE NOTES
Airway  Urgency: elective    Airway not difficult    General Information and Staff    Patient location during procedure: OR    Indications and Patient Condition  Indications for airway management: airway protection    Preoxygenated: yes  MILS maintained throughout  Mask difficulty assessment: 1 - vent by mask    Final Airway Details  Final airway type: endotracheal airway      Successful airway: ETT  Cuffed: yes   Successful intubation technique: direct laryngoscopy  Facilitating devices/methods: intubating stylet  Endotracheal tube insertion site: oral  Blade: Bony  Blade size: #3  ETT size: 7.5 mm  Cormack-Lehane Classification: grade I - full view of glottis  Placement verified by: chest auscultation and capnometry   Measured from: lips  ETT to lips (cm): 21  Number of attempts at approach: 1

## 2018-04-13 NOTE — OP NOTE
NAME: Lora Steiner     YOB: 1970    DATE OF SURGERY: 4/13/2018    PREOPERATIVE DIAGNOSIS:  Rotator cuff syndrome of left shoulder [M75.102]  Moderate persistent asthma without complication [J45.40]  Left cervical radiculopathy [M54.12]  Impingement syndrome, shoulder, left [M75.42]  Acute pain of left shoulder [M25.512]    POSTOPERATIVE DIAGNOSIS:  Post-Op Diagnosis Codes:     * Rotator cuff syndrome of left shoulder [M75.102]     * Moderate persistent asthma without complication [J45.40]     * Left cervical radiculopathy [M54.12]     * Impingement syndrome, shoulder, left [M75.42]     * Acute pain of left shoulder [M25.512]    PROCEDURE PERFORMED:      Arthroscopy of the Left shoulder with    1.  ARVIND procedure   2.  Sub-acromial decompression    SURGEON:  Aaron Tan MD    Assistant:  SHAGUFTA Scott    Staff:    Circulator: Jadyn Pete RN  Scrub Person: Roselyn Cole  Assistant: Flaquita Emmanuel CSA    Anesthesia:  General with Block      Estimated Blood Loss:  minimal    Specimens : None    Complications: none    Implants:  Nothing was implanted during the procedure    DESCRIPTION OF PROCEDURE:   Once consent was obtained, the patient was taken to the operating. Once adequate anesthesia was obtained, then the patient was rolled in the lateral decubitus position, Left side up. All bony prominences were well padded, and an axillary roll was placed. The Left upper extremity was then manipulated through a full range of  motion without any difficulty. The Left upper extremity was then  prepped and draped in the standard surgical fashion. The Left arm was  then placed in the arthroscopic arm ruth. The standard posterior  portal was made, and the diagnostic portion of the arthroscopy began.  The intra-articular portion of the exam showed that the articular  surface was in good condition without any sign of articular cartilage wear.. The biceps tendon showed a firm  attachment onto the glenoid labrum. The rotator cuff was visualized and found to be intact with no sign of undersurface fraying.  There was a large intra-articular bubble present. There were no loose bodies in the intra-articular space or in the subacromial recess.      The subacromial space was then entered and there was noted to be a large  subacromial spur on the anterior aspect. There also was noted to be  significant inflammatory bursitis, which was resected using electrocautery and the  suction shaver. Once the acromion and the clavicle were clearly  identified, then the bryan was used to resect the subacromial spur and to  turn the acromion into a type 1 acromion. This was done after having  established a lateral portal using an 18-gauge spinal needle for  localization and a small stab wound incision.      At this point, the anterior portal was also established using an 18-  gauge spinal needle for localization and a small stab wound incision.  The cannula was inserted and the distal clavicle resection was performed  in the standard fashion resecting approximately 8-10 mm of bone.      The attention was returned to the rotator cuff which was found to be intact.  The bursal surface of the rotator cuff showed no sign of fraying or irregularity.  The shoulder was internally and externally rotated to confirm attachment all along the border.    The shaver was then allowed to run on suction to remove any  remaining loose fragments. The arthroscopy was then terminated. The  wounds were closed with interrupted nylon suture, covered with Xeroform  gauze, 4x4's, and Elastoplast dressing. The patient was then awakened  and taken to the recovery room in good condition. He tolerated the  procedure very well.      Aaron Tan MD     Date: 4/13/2018  Time: 4:49 PM

## 2018-04-14 VITALS
SYSTOLIC BLOOD PRESSURE: 134 MMHG | TEMPERATURE: 96 F | RESPIRATION RATE: 17 BRPM | BODY MASS INDEX: 34.89 KG/M2 | WEIGHT: 189.6 LBS | DIASTOLIC BLOOD PRESSURE: 68 MMHG | HEART RATE: 84 BPM | HEIGHT: 62 IN | OXYGEN SATURATION: 98 %

## 2018-04-14 LAB
ALBUMIN SERPL-MCNC: 3.9 G/DL (ref 3.4–4.8)
ALBUMIN/GLOB SERPL: 1.1 G/DL (ref 1.1–1.8)
ALP SERPL-CCNC: 50 U/L (ref 38–126)
ALT SERPL W P-5'-P-CCNC: 32 U/L (ref 9–52)
ANION GAP SERPL CALCULATED.3IONS-SCNC: 14 MMOL/L (ref 5–15)
AST SERPL-CCNC: 26 U/L (ref 14–36)
BILIRUB SERPL-MCNC: 0.4 MG/DL (ref 0.2–1.3)
BUN BLD-MCNC: 12 MG/DL (ref 7–21)
BUN/CREAT SERPL: 16.7 (ref 7–25)
CALCIUM SPEC-SCNC: 8.4 MG/DL (ref 8.4–10.2)
CHLORIDE SERPL-SCNC: 97 MMOL/L (ref 95–110)
CO2 SERPL-SCNC: 25 MMOL/L (ref 22–31)
CREAT BLD-MCNC: 0.72 MG/DL (ref 0.5–1)
DEPRECATED RDW RBC AUTO: 41.7 FL (ref 36.4–46.3)
ERYTHROCYTE [DISTWIDTH] IN BLOOD BY AUTOMATED COUNT: 14.6 % (ref 11.5–14.5)
GFR SERPL CREATININE-BSD FRML MDRD: 87 ML/MIN/1.73 (ref 58–135)
GLOBULIN UR ELPH-MCNC: 3.4 GM/DL (ref 2.3–3.5)
GLUCOSE BLD-MCNC: 137 MG/DL (ref 60–100)
HCT VFR BLD AUTO: 38.2 % (ref 35–45)
HGB BLD-MCNC: 12.6 G/DL (ref 12–15.5)
MAGNESIUM SERPL-MCNC: 1.9 MG/DL (ref 1.6–2.3)
MCH RBC QN AUTO: 25.5 PG (ref 26.5–34)
MCHC RBC AUTO-ENTMCNC: 33 G/DL (ref 31.4–36)
MCV RBC AUTO: 77.3 FL (ref 80–98)
PLATELET # BLD AUTO: 303 10*3/MM3 (ref 150–450)
PMV BLD AUTO: 10.2 FL (ref 8–12)
POTASSIUM BLD-SCNC: 3.7 MMOL/L (ref 3.5–5.1)
POTASSIUM BLD-SCNC: 4 MMOL/L (ref 3.5–5.1)
PROT SERPL-MCNC: 7.3 G/DL (ref 6.3–8.6)
RBC # BLD AUTO: 4.94 10*6/MM3 (ref 3.77–5.16)
SODIUM BLD-SCNC: 136 MMOL/L (ref 137–145)
WBC NRBC COR # BLD: 13.61 10*3/MM3 (ref 3.2–9.8)

## 2018-04-14 PROCEDURE — 99024 POSTOP FOLLOW-UP VISIT: CPT | Performed by: ORTHOPAEDIC SURGERY

## 2018-04-14 PROCEDURE — 25010000002 ONDANSETRON PER 1 MG: Performed by: INTERNAL MEDICINE

## 2018-04-14 PROCEDURE — 80053 COMPREHEN METABOLIC PANEL: CPT | Performed by: INTERNAL MEDICINE

## 2018-04-14 PROCEDURE — 85027 COMPLETE CBC AUTOMATED: CPT | Performed by: INTERNAL MEDICINE

## 2018-04-14 PROCEDURE — G0378 HOSPITAL OBSERVATION PER HR: HCPCS

## 2018-04-14 RX ORDER — OXYCODONE AND ACETAMINOPHEN 7.5; 325 MG/1; MG/1
1 TABLET ORAL EVERY 4 HOURS PRN
Qty: 10 TABLET | Refills: 0 | Status: SHIPPED | OUTPATIENT
Start: 2018-04-14 | End: 2018-04-23

## 2018-04-14 RX ORDER — LEVOFLOXACIN 500 MG/1
500 TABLET, FILM COATED ORAL DAILY
Qty: 6 TABLET | Refills: 0 | Status: SHIPPED | OUTPATIENT
Start: 2018-04-14 | End: 2018-04-24

## 2018-04-14 RX ADMIN — OXYCODONE HYDROCHLORIDE AND ACETAMINOPHEN 1 TABLET: 7.5; 325 TABLET ORAL at 00:52

## 2018-04-14 RX ADMIN — ONDANSETRON HYDROCHLORIDE 4 MG: 2 INJECTION INTRAMUSCULAR; INTRAVENOUS at 08:30

## 2018-04-14 RX ADMIN — FAMOTIDINE 40 MG: 40 TABLET ORAL at 08:29

## 2018-04-14 RX ADMIN — GABAPENTIN 100 MG: 100 CAPSULE ORAL at 09:55

## 2018-04-14 RX ADMIN — OXYCODONE HYDROCHLORIDE AND ACETAMINOPHEN 1 TABLET: 7.5; 325 TABLET ORAL at 06:46

## 2018-04-14 NOTE — PROGRESS NOTES
I was called by the nurse stating that the patient is complaining of dizziness and right and left-sided facial numbness.  No focal neurological deficit was reported.  I examined the patient knew neurological deficit was noted.  She is alert and oriented ×3.  Patient state that she does have history of migraine and usually they do start with the numbness of the face.  She denies any other complaint at this point.  We'll continue to monitor if there is any neurological changes will obtain further studies.  We'll continue with every 4 hour neuro checks.          This document has been electronically signed by Aniket Daniels MD on April 14, 2018 12:49 AM

## 2018-04-14 NOTE — PLAN OF CARE
Problem: Patient Care Overview  Goal: Plan of Care Review  Outcome: Ongoing (interventions implemented as appropriate)    Goal: Individualization and Mutuality  Outcome: Ongoing (interventions implemented as appropriate)    Goal: Discharge Needs Assessment  Outcome: Ongoing (interventions implemented as appropriate)    Goal: Interprofessional Rounds/Family Conf  Outcome: Ongoing (interventions implemented as appropriate)      Problem: Surgery Nonspecified (Adult)  Goal: Signs and Symptoms of Listed Potential Problems Will be Absent, Minimized or Managed (Surgery Nonspecified)  Outcome: Ongoing (interventions implemented as appropriate)    Goal: Anesthesia/Sedation Recovery  Outcome: Outcome(s) achieved Date Met: 04/14/18

## 2018-04-14 NOTE — H&P
[facility] Medicine Admission      Date of Admission: 4/13/2018      Primary Care Physician: ANA LAURA King      Chief Complaint: sob     HPI:This is a 47 y old woman with hx of rotator cuff syndrome of left shoulder ,inpingement syndrome of left shoulder , left cervical radiculopathy  Who had today an arthroscopy and subacromial decompression and luis angel procedure by DR Tan  After extubation she became hypoxic  , a chest xray showed mild  pumonary edema vs atypical pneumonia       Past Medical History:  has a past medical history of Asthma; GERD (gastroesophageal reflux disease); Hand pain; Kidney stone; Migraines; Shoulder pain, left; and Wears glasses.    Past Surgical History:  has a past surgical history that includes Vaginal delivery and Cholecystectomy.    Family History: family history includes Asthma in her other; Cancer in her other; Diabetes in her other; Hypertension in her father; Kidney disease in her other; Migraines in her other.    Social History:  reports that she has never smoked. She has never used smokeless tobacco. She reports that she does not drink alcohol or use drugs.    Allergies: No Known Allergies    Medications:   Prescriptions Prior to Admission   Medication Sig Dispense Refill Last Dose   • gabapentin (NEURONTIN) 100 MG capsule Take 100 mg by mouth 3 (Three) Times a Day.   4/12/2018 at 1200   • montelukast (SINGULAIR) 10 MG tablet Take 10 mg by mouth Daily As Needed.   4/12/2018 at 0900   • OMEPRAZOLE PO Take 40 mg by mouth Daily.   4/13/2018 at 0600   • albuterol (VENTOLIN HFA) 108 (90 BASE) MCG/ACT inhaler Inhale 2 puffs Every 4 (Four) Hours As Needed for Wheezing or Shortness of Air. 1 inhaler 11 More than a month   • ergocalciferol (ERGOCALCIFEROL) 59047 units capsule Take 50,000 Units by mouth 1 (One) Time Per Week.   4/10/2018   • fluticasone (FLONASE) 50 MCG/ACT nasal spray 2 sprays into each nostril Daily.   4/11/2018   •  levonorgestrel (MIRENA) 20 MCG/24HR IUD 1 each by Intrauterine route.   2017   • meloxicam (MOBIC) 15 MG tablet Take 15 mg by mouth Daily. On hold for surgery 4-6-18 last dose   4/6/2018   • ZOLMitriptan (ZOMIG) 5 MG tablet 1 Po PRN headache, can repeat in 2 hrs if headache recurrence. (Patient taking differently: Take 5 mg by mouth 1 (One) Time As Needed. 1 Po PRN headache, can repeat in 2 hrs if headache recurrence.) 30 tablet 1 More than a month       Review of Systems:  Review of Systems   Constitutional: Negative for activity change, appetite change, chills, diaphoresis, fatigue, fever and unexpected weight change.   HENT: Negative.  Negative for congestion, dental problem, drooling, ear discharge, ear pain, facial swelling, hearing loss, mouth sores, nosebleeds, postnasal drip, rhinorrhea, sinus pressure, sneezing, tinnitus, trouble swallowing and voice change.    Eyes: Negative for photophobia and discharge.   Respiratory: Positive for shortness of breath. Negative for apnea, cough, choking, wheezing and stridor.    Cardiovascular: Negative for chest pain, palpitations and leg swelling.   Gastrointestinal: Negative for abdominal pain, anal bleeding, blood in stool, constipation, diarrhea, nausea, rectal pain and vomiting.   Endocrine: Negative for cold intolerance, heat intolerance, polydipsia, polyphagia and polyuria.   Genitourinary: Negative for dysuria, enuresis, frequency, hematuria and urgency.   Musculoskeletal: Negative for arthralgias, back pain, joint swelling, myalgias, neck pain and neck stiffness.   Skin: Negative for color change, pallor, rash and wound.   Allergic/Immunologic: Negative for food allergies and immunocompromised state.   Neurological: Negative for dizziness, tremors, seizures, syncope, facial asymmetry, speech difficulty, weakness, light-headedness, numbness and headaches.   Hematological: Negative for adenopathy.   Psychiatric/Behavioral: Negative for agitation, behavioral  problems, confusion, hallucinations, sleep disturbance and suicidal ideas. The patient is not nervous/anxious.       Otherwise complete ROS is negative except as mentioned above.    Physical Exam:   Temp:  [97 °F (36.1 °C)-99.6 °F (37.6 °C)] 99.3 °F (37.4 °C)  Heart Rate:  [] 112  Resp:  [10-20] 18  BP: (116-141)/(63-86) 134/72  Physical Exam   Constitutional: She is oriented to person, place, and time. She appears well-developed and well-nourished.   Eyes: EOM are normal. Pupils are equal, round, and reactive to light.   Neck: Normal range of motion. Neck supple.   Cardiovascular: Normal rate.    Pulmonary/Chest: Effort normal and breath sounds normal.   Abdominal: Soft. Bowel sounds are normal.   Neurological: She is alert and oriented to person, place, and time.   Skin: Skin is warm and dry.   Psychiatric: Her behavior is normal. Judgment and thought content normal.         Results Reviewed:  I have personally reviewed current lab, radiology, and data and agree with results.  Lab Results (last 24 hours)     Procedure Component Value Units Date/Time    Pregnancy, Urine - Urine, Clean Catch [[id number]]  (Normal) Collected:  04/13/18 1247    Specimen:  Urine from Urine, Clean Catch Updated:  04/13/18 1308     HCG, Urine QL Negative        Imaging Results (last 24 hours)     Procedure Component Value Units Date/Time    XR Chest 1 View [[id number]] Collected:  04/13/18 1720     Updated:  04/13/18 1740    Narrative:       [facility] [place]    Patient Name: MRS. FRANCO CUMMINGS    ORDERING: BRETT MARQUES     ATTENDING: JAMES JACQUES     REFERRING: BRETT MARQUES    -----------------------    PROCEDURE: Portable chest x-ray    TECHNIQUE: Single AP view of the chest    COMPARISON: 3/20/2017    HISTORY: post procedure, M75.102 Unspecified rotator cuff tear or  rupture of left shoulder, not specified as traumatic M25.512 Pain  in left shoulder M75.42 Impingement syndrome of  left shoulder  M54.12 Radiculopathy, cervical region J45.40 Moderate persistent  asthma, uncomplicated E66.09 Other obesity due to excess calories  Z68.35 Body mass index (BMI) 35.0-35.9, adult    FINDINGS:     Life-support devices: None    Lungs/pleura: Prominent interstitial opacification bilaterally,  possibly due to mild congestive heart failure or atypical  pneumonia.    Heart, hilar and mediastinal structures: Cardiac silhouette is  normal in size.    There is a rightward curvature of the thoracic spine.      Impression:       CONCLUSION:  Prominent interstitial opacification bilaterally, possibly due to  mild congestive heart failure or atypical pneumonia.    Electronically signed by:  Pritesh Payton MD  4/13/2018 5:36 PM CDT  Workstation: [id number]            Assessment:    Hospital Problem List     * (Principal)Impingement syndrome, shoulder, left    Moderate persistent asthma without complication    Class 2 obesity due to excess calories without serious comorbidity with body mass index (BMI) of 35.0 to 35.9 in adult    Left shoulder pain    Rotator cuff syndrome of left shoulder    Acute pain of left shoulder    Overview Signed 3/1/2018  4:16 PM by Aaron Tan MD     Added automatically from request for surgery [id number]           hypoxia   S/p left shoulder arthroscopy   Mild pulmonary edema vs atypical   Asthma         Plan:  Admit to  Med surgical floor   Oxygen keep saturation above 90%  Iv lasix   Iv antibiotics  Stop IV fluids   Continue home meds as medical course dictates  dvt prophylaxis scd s and teds     I discussed the patients findings and my recommendations with: patient     Kayla Angel MD  04/13/18  7:26 PM

## 2018-04-14 NOTE — DISCHARGE SUMMARY
[facility] Medicine Services  DISCHARGE SUMMARY       Date of Admission: 4/13/2018  Date of Discharge:  4/14/2018  Primary Care Physician: ANA LAURA King    Presenting Problem/History of Present Illness:  Rotator cuff syndrome of left shoulder [M75.102]  Moderate persistent asthma without complication [J45.40]  Left cervical radiculopathy [M54.12]  Impingement syndrome, shoulder, left [M75.42]  Acute pain of left shoulder [M25.512]  Rotator cuff syndrome of left shoulder [M75.102]  Rotator cuff syndrome of left shoulder [M75.102]       Final Discharge Diagnoses:  Hospital Problem List     * (Principal)Impingement syndrome, shoulder, left    Moderate persistent asthma without complication    Class 2 obesity due to excess calories without serious comorbidity with body mass index (BMI) of 35.0 to 35.9 in adult    Left shoulder pain    Rotator cuff syndrome of left shoulder    Acute pain of left shoulder    Overview Signed 3/1/2018  4:16 PM by Aaron Tan MD     Added automatically from request for surgery [id number]           atypical pneumonia     Consults:   Consults     No orders found for last 30 day(s).          Procedures Performed: Procedure(s):  arthroscopy of the left shoulder with subacromial decompression and Luis Angel procedure.                Pertinent Test Results:   XR Chest 1 View   CONCLUSION:  Prominent interstitial opacification bilaterally, possibly due to  mild congestive heart failure or atypical pneumonia  Chief Complaint on Day of Discharge: None     Hospital Course:This is a 47 y old woman with hx of rotator cuff syndrome of left shoulder ,inpingement syndrome of left shoulder , left cervical radiculopathy  Who had today an arthroscopy and subacromial decompression and luis angel procedure by DR Tan  After extubation she became hypoxic  , a chest xray showed mild  pumonary edema vs atypical pneumonia   She was kept in the hospital for  "monitoring ,was given some Iv lasix and was started on some levaquin her wbc was elevated at 75395 the repeat one was 82207 . Her saturation  Did not drop on exertion . She did report some facial numbness that was bilat the night doctor examined her but  Did not find any focal deficits and she did state that this  Symptoms were not new and were related to her migraines . The symptoms resolved in  The morning .  We asked her to follow up with her primary care in a week . She does have a follow up with Dr Tan also      .      Condition on Discharge:  Stable     Physical Exam on Discharge:  /68   Pulse 84   Temp 96 °F (35.6 °C) (Oral)   Resp 17   Ht 157.5 cm (62\")   Wt 86 kg (189 lb 9.5 oz)   SpO2 98%   BMI 34.68 kg/m²   Physical Exam   Constitutional: She is oriented to person, place, and time. She appears well-developed and well-nourished.   HENT:   Head: Normocephalic and atraumatic.   Eyes: EOM are normal. Pupils are equal, round, and reactive to light.   Neck: Normal range of motion. Neck supple.   Cardiovascular: Normal rate and regular rhythm.    Pulmonary/Chest: Effort normal and breath sounds normal.   Abdominal: Soft. Bowel sounds are normal.   Neurological: She is alert and oriented to person, place, and time.   Skin: Skin is warm and dry.         Discharge Disposition:  Home or Self Care    Discharge Medications:   Lora Steiner   Home Medication Instructions SHIVAM:[id number]    Printed on:04/14/18 8427   Medication Information                      albuterol (VENTOLIN HFA) 108 (90 BASE) MCG/ACT inhaler  Inhale 2 puffs Every 4 (Four) Hours As Needed for Wheezing or Shortness of Air.             ergocalciferol (ERGOCALCIFEROL) 55383 units capsule  Take 50,000 Units by mouth 1 (One) Time Per Week.             fluticasone (FLONASE) 50 MCG/ACT nasal spray  2 sprays into each nostril Daily.             gabapentin (NEURONTIN) 100 MG capsule  Take 100 mg by mouth 3 (Three) Times a Day.           "   levoFLOXacin (LEVAQUIN) 500 MG tablet  Take 1 tablet by mouth Daily.             levonorgestrel (MIRENA) 20 MCG/24HR IUD  1 each by Intrauterine route.             meloxicam (MOBIC) 15 MG tablet  Take 15 mg by mouth Daily. On hold for surgery 4-6-18 last dose             montelukast (SINGULAIR) 10 MG tablet  Take 10 mg by mouth Daily As Needed.             OMEPRAZOLE PO  Take 40 mg by mouth Daily.             oxyCODONE-acetaminophen (PERCOCET) 7.5-325 MG per tablet  Take 1 tablet by mouth Every 4 (Four) Hours As Needed (Pain).             oxyCODONE-acetaminophen (PERCOCET) 7.5-325 MG per tablet  Take 1 tablet by mouth Every 4 (Four) Hours As Needed for Moderate Pain  for up to 9 days.             ZOLMitriptan (ZOMIG) 5 MG tablet  1 Po PRN headache, can repeat in 2 hrs if headache recurrence.                 Discharge Diet:   Diet Instructions     Diet: Regular; Thin       Discharge Diet:  Regular    Fluid Consistency:  Thin        As tolerated                    Activity at Discharge:   Activity Instructions     Activity as Tolerated       Patient May Shower; No Tub Baths, Pools or Hot Tubs       Number of days:  1 Comment - postop          Follow-up Appointments:   Future Appointments  Date Time Provider Department Center   4/24/2018 10:40 AM ANA LAURA Jones [facility] None   4/27/2018 2:45 PM Clau Barlow MD [facility] None       Test Results Pending at Discharge: None     Kayla Angel MD  04/14/18  1:59 PM    Time: 30 min

## 2018-04-14 NOTE — PROGRESS NOTES
ORTHO:    No new complaints  Breathing better.  Shoulder pain worse today    Vitals:    04/14/18 0832   BP: 134/68   Pulse: 84   Resp: 17   Temp:    SpO2: 98%     Awake, alert, oriented.  Stable exam  Good finger motion  Not requiring O2 now.    S/p arthroscopy left shoulder    Postop noted to have rhonchi on pulmonary exam   Xray revealed pulmonary edema or pneumonia involving right lung     Seems to be doing better today.  Plan per hospitalist   Will need primary care f/u    May or may not require echo  Other d/c orders per me yesterday    Sling for comfort left upper extremity.    04/14/18 at 9:52 AM by Aaron Tan MD

## 2018-04-17 RX ORDER — OXYCODONE AND ACETAMINOPHEN 7.5; 325 MG/1; MG/1
1 TABLET ORAL EVERY 4 HOURS PRN
Qty: 40 TABLET | Refills: 0 | Status: SHIPPED | OUTPATIENT
Start: 2018-04-17 | End: 2018-05-24

## 2018-04-24 ENCOUNTER — OFFICE VISIT (OUTPATIENT)
Dept: ORTHOPEDIC SURGERY | Facility: CLINIC | Age: 48
End: 2018-04-24

## 2018-04-24 VITALS — WEIGHT: 193 LBS | BODY MASS INDEX: 35.51 KG/M2 | HEIGHT: 62 IN

## 2018-04-24 DIAGNOSIS — M75.42 IMPINGEMENT SYNDROME, SHOULDER, LEFT: ICD-10-CM

## 2018-04-24 DIAGNOSIS — M25.512 ACUTE PAIN OF LEFT SHOULDER: Primary | ICD-10-CM

## 2018-04-24 DIAGNOSIS — M75.102 ROTATOR CUFF SYNDROME OF LEFT SHOULDER: ICD-10-CM

## 2018-04-24 DIAGNOSIS — Z98.890 STATUS POST ARTHROSCOPY OF SHOULDER: ICD-10-CM

## 2018-04-24 PROCEDURE — 99024 POSTOP FOLLOW-UP VISIT: CPT | Performed by: NURSE PRACTITIONER

## 2018-04-24 NOTE — PROGRESS NOTES
Lora Steiner is a 47 y.o. female is s/p       Chief Complaint   Patient presents with   • Left Shoulder - Post-op     Date of Surgery: 4/13/2018    HISTORY OF PRESENT ILLNESS: Patient presents to office for postoperative follow up. Patient is doing well with no unusual complaints or concerns noted. Patient is attending physical therapy. Sutures are removed today.     PROCEDURE PERFORMED:      Arthroscopy of the Left shoulder with                     1.  ARVIND procedure              2.  Sub-acromial decompression      No Known Allergies      Current Outpatient Prescriptions:   •  albuterol (VENTOLIN HFA) 108 (90 BASE) MCG/ACT inhaler, Inhale 2 puffs Every 4 (Four) Hours As Needed for Wheezing or Shortness of Air., Disp: 1 inhaler, Rfl: 11  •  ergocalciferol (ERGOCALCIFEROL) 36798 units capsule, Take 50,000 Units by mouth 1 (One) Time Per Week., Disp: , Rfl:   •  fluticasone (FLONASE) 50 MCG/ACT nasal spray, 2 sprays into each nostril Daily., Disp: , Rfl:   •  gabapentin (NEURONTIN) 100 MG capsule, Take 100 mg by mouth 3 (Three) Times a Day., Disp: , Rfl:   •  levonorgestrel (MIRENA) 20 MCG/24HR IUD, 1 each by Intrauterine route., Disp: , Rfl:   •  montelukast (SINGULAIR) 10 MG tablet, Take 10 mg by mouth Daily As Needed., Disp: , Rfl:   •  OMEPRAZOLE PO, Take 40 mg by mouth Daily., Disp: , Rfl:   •  oxyCODONE-acetaminophen (PERCOCET) 7.5-325 MG per tablet, Take 1 tablet by mouth Every 4 (Four) Hours As Needed (Pain)., Disp: 40 tablet, Rfl: 0  •  ZOLMitriptan (ZOMIG) 5 MG tablet, 1 Po PRN headache, can repeat in 2 hrs if headache recurrence. (Patient taking differently: Take 5 mg by mouth 1 (One) Time As Needed. 1 Po PRN headache, can repeat in 2 hrs if headache recurrence.), Disp: 30 tablet, Rfl: 1    No fevers or chills.  No nausea or vomiting. Left shoulder pain.       PHYSICAL EXAMINATION:       Lora Steiner is a 47 y.o. female    Patient is awake and alert, answers questions appropriately and is in no  apparent distress.    GAIT:     [x]  Normal  []  Antalgic    Assistive device: [x]  None  []  Walker     []  Crutches  []  Cane     []  Wheelchair  []  Stretcher    Left Shoulder Exam     Tenderness   Left shoulder tenderness location: diffuse, mild.    Range of Motion   Active Abduction: 90   Passive Abduction: 130   Forward Flexion: 100     Other   Erythema: absent  Sensation: normal  Pulse: present     Comments:  Arthroscopic surgical incisions are well approximated with good evidence of healing.  No erythema and no drainage noted.  No signs of infection noted.  Pain and limitations with range of motion.              ASSESSMENT:    Diagnoses and all orders for this visit:    Acute pain of left shoulder    Rotator cuff syndrome of left shoulder    Impingement syndrome, shoulder, left    Status post arthroscopy of shoulder    PLAN    Patient is doing well postoperatively.  Incisions are healing well.  Sutures are removed today. Wound care instructions provided. Patient is instructed to continue to monitor the incisions for any signs of infection including increased redness, increased swelling, increased warmth, increased tenderness and/or purulent drainage.  Patient is instructed to notify the office immediately if any signs of infection are noted.  Continue with physical therapy.  Continue with home exercises and gradual progression of activity and range of motion as tolerated and based on her pain. Continue with ice therapy to the left shoulder intermittently 3-4 times daily for 15-20 minutes at a time as needed for pain.  Continue Percocet as needed for moderate to severe pain.  Recommend Tylenol or Ibuprofen as needed for milder pain. Follow up in 4 weeks for recheck.    Return in about 4 weeks (around 5/22/2018) for Recheck.      This document has been electronically signed by ANA LAURA Jones on April 24, 2018 4:00 PM      ANA LAURA Jones

## 2018-04-27 ENCOUNTER — OFFICE VISIT (OUTPATIENT)
Dept: PULMONOLOGY | Facility: CLINIC | Age: 48
End: 2018-04-27

## 2018-04-27 VITALS
HEART RATE: 67 BPM | DIASTOLIC BLOOD PRESSURE: 90 MMHG | BODY MASS INDEX: 35.9 KG/M2 | WEIGHT: 195.1 LBS | HEIGHT: 62 IN | OXYGEN SATURATION: 99 % | SYSTOLIC BLOOD PRESSURE: 134 MMHG

## 2018-04-27 DIAGNOSIS — E66.09 CLASS 2 OBESITY DUE TO EXCESS CALORIES WITHOUT SERIOUS COMORBIDITY WITH BODY MASS INDEX (BMI) OF 35.0 TO 35.9 IN ADULT: ICD-10-CM

## 2018-04-27 DIAGNOSIS — K21.9 GASTROESOPHAGEAL REFLUX DISEASE WITHOUT ESOPHAGITIS: ICD-10-CM

## 2018-04-27 DIAGNOSIS — J45.40 MODERATE PERSISTENT ASTHMA WITHOUT COMPLICATION: Primary | ICD-10-CM

## 2018-04-27 DIAGNOSIS — J30.2 CHRONIC SEASONAL ALLERGIC RHINITIS, UNSPECIFIED TRIGGER: ICD-10-CM

## 2018-04-27 PROBLEM — R05.3 CHRONIC COUGH: Status: RESOLVED | Noted: 2017-04-04 | Resolved: 2018-04-27

## 2018-04-27 PROCEDURE — 99214 OFFICE O/P EST MOD 30 MIN: CPT | Performed by: INTERNAL MEDICINE

## 2018-04-27 RX ORDER — CETIRIZINE HYDROCHLORIDE 10 MG/1
10 TABLET ORAL DAILY
COMMUNITY
End: 2021-10-21

## 2018-04-27 NOTE — PROGRESS NOTES
Pulmonary Office Follow-up    Subjective     Lora Steiner is seen today at the office for   Chief Complaint   Patient presents with   • 3 mo f/u   • Asthma         HPI  Lora Steiner is a 47 y.o. female with a PMH significant for asthma, anemia, and back pain who presents for follow-up of asthma.  She was last seen 1/29/18, at which time she was doing better so I recommended de-escalating to Breo 100 daily. Pt states she has been having some trouble since having rotator cuff surgery on 4/13. She was fine pre-operatively but post-op she had dyspnea. Pt was admitted overnight and treated for pneumonia and fluid overload. CXR was consistent with pulmonary edema. She denies any infectious symptoms before or after her surgery. Otherwise, she has been doing well and has not had any problems since going down to the Breo 100 daily.  She states she rarely has to use her albuterol and actually think she hasn't used it since the last time she saw me.  She has an occasional throat clearing due to some mucus in her throat, but denies any significant cough, chest pain, wheeze, or allergy symptoms or if she is undergoing physical therapy for her arm and she is currently out of work.    Patient Active Problem List   Diagnosis   • Abdominal pain   • Abnormal mammogram   • Back pain   • Skin sensation disturbance   • Abnormal radiographic examination   • Otalgia   • Allergic rhinitis   • Moderate persistent asthma without complication   • Class 2 obesity due to excess calories without serious comorbidity with body mass index (BMI) of 35.0 to 35.9 in adult   • Left shoulder pain   • Impingement syndrome, shoulder, left   • Rotator cuff syndrome of left shoulder   • Left cervical radiculopathy   • Acute pain of left shoulder   • Neck pain   • Dizzy spells   • Dysfunction of both eustachian tubes   • Low iron   • Status post arthroscopy of shoulder       Review of Systems  Review of Systems   Constitutional: Negative for fever.    HENT: Negative for congestion and postnasal drip.    Respiratory: Negative for cough, shortness of breath and wheezing.    Cardiovascular: Negative for chest pain and leg swelling.   Gastrointestinal:        Heartburn   Musculoskeletal: Positive for arthralgias.     As described in the HPI. Otherwise, remainder of ROS (14 systems) were negative.    Medications, Allergies, Social, and Family Histories reviewed as per EMR.    Objective     Vitals:    04/27/18 1454   BP: 134/90   Pulse: 67   SpO2: 99%     Physical Exam   Constitutional: She is oriented to person, place, and time. Vital signs are normal. She appears well-developed and well-nourished.   HENT:   Head: Normocephalic and atraumatic.   Mouth/Throat: Uvula is midline, oropharynx is clear and moist and mucous membranes are normal.   Mallampati 3   Eyes: Conjunctivae, EOM and lids are normal. Pupils are equal, round, and reactive to light.   Neck: Trachea normal and normal range of motion. No tracheal tenderness present. No thyroid mass present.   Cardiovascular: Normal rate, regular rhythm and normal heart sounds.  PMI is not displaced.  Exam reveals no gallop.    No murmur heard.  Pulmonary/Chest: Effort normal. No respiratory distress. She has no decreased breath sounds. She has no wheezes. She has no rhonchi. She exhibits no tenderness.   Abdominal: Soft. Normal appearance. There is no hepatosplenomegaly.   Musculoskeletal:   Normal gait, no extremity edema     Vascular Status -  Her right foot exhibits no edema. Her left foot exhibits no edema.  Lymphadenopathy:        Head (right side): No submandibular adenopathy present.        Head (left side): No submandibular adenopathy present.     She has no cervical adenopathy.        Right: No supraclavicular adenopathy present.        Left: No supraclavicular adenopathy present.   Neurological: She is alert and oriented to person, place, and time.   Skin: Skin is warm and dry. No cyanosis. Nails show no  clubbing.   Psychiatric: She has a normal mood and affect. Her speech is normal and behavior is normal. Judgment normal.   Nursing note and vitals reviewed.          Assessment/Plan     Lora was seen today for 3 mo f/u and asthma.    Diagnoses and all orders for this visit:    Moderate persistent asthma without complication    Chronic seasonal allergic rhinitis, unspecified trigger    Class 2 obesity due to excess calories without serious comorbidity with body mass index (BMI) of 35.0 to 35.9 in adult    Gastroesophageal reflux disease without esophagitis         Discussion/ Recommendations:   She is doing well since de-escalating to Breo 100 and has not required her albuterol.  I hesitate to de-escalate further given that we are in allergy season and she does have significant springtime allergies.  On review of her record, I think she likely had acute pulmonary edema from the significant IV fluid she received during her elective surgery, given that she lacked any symptoms prior to surgery and had rapid resolution of her symptoms within 24 hours.    -Continue Breo 100 daily.  If she is still doing well at her next visit, we'll consider de-escalating to have daily ICS.  -Albuterol as needed.  -Continue Singulair and Flonase. Try stopping Zyrtec.  -Up to date with flu vaccine.  -Trigger avoidance.  -Continue Prilosec 40 mg daily.      Patient's Body mass index is 35.68 kg/m². BMI is above normal parameters. Follow-up plan includes:  referral to primary care.    Return in about 3 months (around 7/27/2018) for Recheck.          This document has been electronically signed by Clau Barlow MD on April 27, 2018 3:16 PM      Dragon dictation used

## 2018-05-24 ENCOUNTER — OFFICE VISIT (OUTPATIENT)
Dept: ORTHOPEDIC SURGERY | Facility: CLINIC | Age: 48
End: 2018-05-24

## 2018-05-24 VITALS — WEIGHT: 196.9 LBS | HEIGHT: 62 IN | BODY MASS INDEX: 36.23 KG/M2

## 2018-05-24 DIAGNOSIS — M75.102 ROTATOR CUFF SYNDROME OF LEFT SHOULDER: Primary | ICD-10-CM

## 2018-05-24 DIAGNOSIS — Z98.890 STATUS POST ARTHROSCOPY OF SHOULDER: ICD-10-CM

## 2018-05-24 DIAGNOSIS — M75.42 IMPINGEMENT SYNDROME, SHOULDER, LEFT: ICD-10-CM

## 2018-05-24 DIAGNOSIS — M25.512 ACUTE PAIN OF LEFT SHOULDER: ICD-10-CM

## 2018-05-24 PROCEDURE — 99024 POSTOP FOLLOW-UP VISIT: CPT | Performed by: ORTHOPAEDIC SURGERY

## 2018-05-24 NOTE — PROGRESS NOTES
Lora Steiner is a 47 y.o. female returns for     Chief Complaint   Patient presents with   • Left Shoulder - Follow-up       HISTORY OF PRESENT ILLNESS:  Follow up left shoulder post Arthroscopy, 4/13/18  Concha ANDRWE  Therapy 3 x per week at [facility].  Improving  Sleeping better  Motion and activity improving.     CONCURRENT MEDICAL HISTORY:    Past Medical History:   Diagnosis Date   • Asthma    • GERD (gastroesophageal reflux disease)    • Hand pain     bilateral   • Kidney stone     had previous stent in right kidney   • Migraines    • Shoulder pain, left    • Wears glasses        No Known Allergies      Current Outpatient Prescriptions:   •  albuterol (VENTOLIN HFA) 108 (90 BASE) MCG/ACT inhaler, Inhale 2 puffs Every 4 (Four) Hours As Needed for Wheezing or Shortness of Air., Disp: 1 inhaler, Rfl: 11  •  cetirizine (zyrTEC) 10 MG tablet, Take 10 mg by mouth Daily., Disp: , Rfl:   •  ergocalciferol (ERGOCALCIFEROL) 81095 units capsule, Take 50,000 Units by mouth 1 (One) Time Per Week., Disp: , Rfl:   •  fluticasone (FLONASE) 50 MCG/ACT nasal spray, 2 sprays into each nostril Daily., Disp: , Rfl:   •  Fluticasone Furoate-Vilanterol (BREO ELLIPTA) 100-25 MCG/INH aerosol powder , Inhale Daily., Disp: , Rfl:   •  gabapentin (NEURONTIN) 100 MG capsule, Take 100 mg by mouth 3 (Three) Times a Day., Disp: , Rfl:   •  levonorgestrel (MIRENA) 20 MCG/24HR IUD, 1 each by Intrauterine route., Disp: , Rfl:   •  montelukast (SINGULAIR) 10 MG tablet, Take 10 mg by mouth Daily As Needed., Disp: , Rfl:   •  OMEPRAZOLE PO, Take 40 mg by mouth Daily., Disp: , Rfl:   •  ZOLMitriptan (ZOMIG) 5 MG tablet, 1 Po PRN headache, can repeat in 2 hrs if headache recurrence. (Patient taking differently: Take 5 mg by mouth 1 (One) Time As Needed. 1 Po PRN headache, can repeat in 2 hrs if headache recurrence.), Disp: 30 tablet, Rfl: 1    Past Surgical History:   Procedure Laterality Date   • CHOLECYSTECTOMY     • SHOULDER ARTHROSCOPY Left  "4/13/2018    Procedure: arthroscopy of the left shoulder with subacromial decompression and Concha procedure.;  Surgeon: Aaron Tan MD;  Location: [facility];  Service: Orthopedics   • VAGINAL DELIVERY      hx of 3 spontaneous abortions in 1995,1996,2002       ROS  No fevers or chills.  No chest pain or shortness of air.  No GI or  disturbances.    PHYSICAL EXAMINATION:       Ht 157.5 cm (62\")   Wt 89.3 kg (196 lb 14.4 oz)   BMI 36.01 kg/m²     Physical Exam   Constitutional: She is oriented to person, place, and time. She appears well-developed and well-nourished.   Neurological: She is alert and oriented to person, place, and time.   Psychiatric: She has a normal mood and affect. Her behavior is normal. Judgment and thought content normal.       GAIT:     [x]  Normal  []  Antalgic    Assistive device: [x]  None  []  Walker     []  Crutches  []  Cane     []  Wheelchair  []  Stretcher    Left Shoulder Exam     Tenderness   The patient is experiencing no tenderness.         Range of Motion   Active Abduction: 100 (150 with assistance.)   Forward Flexion: 140     Muscle Strength   Abduction: 4/5   Supraspinatus: 4/5     Other   Erythema: absent  Sensation: normal  Pulse: present               No results found.          ASSESSMENT:    Diagnoses and all orders for this visit:    Rotator cuff syndrome of left shoulder    Acute pain of left shoulder    Impingement syndrome, shoulder, left    Status post arthroscopy of shoulder          PLAN    Slowly progress motion and activity as tolerated  Continue with PT and HEP  Recheck in 6 weeks  Plan return to work in the next 4-8 weeks.    Patient's Body mass index is 36.01 kg/m². BMI is above normal parameters. Recommendations include: exercise counseling and nutrition counseling.      Return in about 6 weeks (around 7/5/2018).    Aaron Tan MD  "

## 2018-07-10 ENCOUNTER — OFFICE VISIT (OUTPATIENT)
Dept: ORTHOPEDIC SURGERY | Facility: CLINIC | Age: 48
End: 2018-07-10

## 2018-07-10 VITALS — WEIGHT: 195 LBS | HEIGHT: 62 IN | BODY MASS INDEX: 35.88 KG/M2

## 2018-07-10 DIAGNOSIS — M75.102 ROTATOR CUFF SYNDROME OF LEFT SHOULDER: Primary | ICD-10-CM

## 2018-07-10 DIAGNOSIS — M75.42 IMPINGEMENT SYNDROME, SHOULDER, LEFT: ICD-10-CM

## 2018-07-10 DIAGNOSIS — Z98.890 STATUS POST ARTHROSCOPY OF SHOULDER: ICD-10-CM

## 2018-07-10 DIAGNOSIS — M25.512 ACUTE PAIN OF LEFT SHOULDER: ICD-10-CM

## 2018-07-10 DIAGNOSIS — M54.12 LEFT CERVICAL RADICULOPATHY: ICD-10-CM

## 2018-07-10 PROCEDURE — 99024 POSTOP FOLLOW-UP VISIT: CPT | Performed by: ORTHOPAEDIC SURGERY

## 2018-07-10 RX ORDER — TOPIRAMATE 25 MG/1
25 CAPSULE, COATED PELLETS ORAL 2 TIMES DAILY
COMMUNITY

## 2018-07-10 RX ORDER — HYDROCHLOROTHIAZIDE 12.5 MG/1
12.5 CAPSULE, GELATIN COATED ORAL DAILY
COMMUNITY

## 2018-07-10 NOTE — PROGRESS NOTES
Lora Steiner is a 47 y.o. female is s/p       Chief Complaint   Patient presents with   • Left Shoulder - Post-op     4/13/18  Surgeon Role   Aaron Tan MD Primary    Procedure Laterality Anesthesia   arthroscopy of the left shoulder with subacromial decompression and Concha procedure. Left General with Block          HISTORY OF PRESENT ILLNESS: Patient being seen for left shoulder post-op. Patient doing physical therapy 2 days/week at [facility]. Patient states she is still experiencing some limited ROM, however she is currently working with therapy on improving that.        No Known Allergies      Current Outpatient Prescriptions:   •  albuterol (VENTOLIN HFA) 108 (90 BASE) MCG/ACT inhaler, Inhale 2 puffs Every 4 (Four) Hours As Needed for Wheezing or Shortness of Air., Disp: 1 inhaler, Rfl: 11  •  cetirizine (zyrTEC) 10 MG tablet, Take 10 mg by mouth Daily., Disp: , Rfl:   •  ergocalciferol (ERGOCALCIFEROL) 05433 units capsule, Take 50,000 Units by mouth 1 (One) Time Per Week., Disp: , Rfl:   •  fluticasone (FLONASE) 50 MCG/ACT nasal spray, 2 sprays into each nostril Daily., Disp: , Rfl:   •  Fluticasone Furoate-Vilanterol (BREO ELLIPTA) 100-25 MCG/INH aerosol powder , Inhale Daily., Disp: , Rfl:   •  hydrochlorothiazide (MICROZIDE) 12.5 MG capsule, Take 12.5 mg by mouth Daily., Disp: , Rfl:   •  levonorgestrel (MIRENA) 20 MCG/24HR IUD, 1 each by Intrauterine route., Disp: , Rfl:   •  montelukast (SINGULAIR) 10 MG tablet, Take 10 mg by mouth Daily As Needed., Disp: , Rfl:   •  OMEPRAZOLE PO, Take 40 mg by mouth Daily., Disp: , Rfl:   •  topiramate (TOPAMAX) 25 MG capsule, Take 25 mg by mouth 2 (Two) Times a Day., Disp: , Rfl:     No fevers or chills.  No nausea or vomiting.      PHYSICAL EXAMINATION:       Lora Steiner is a 47 y.o. female    Patient is awake and alert, answers questions appropriately and is in no apparent distress.    GAIT:     [x]  Normal  []  Antalgic    Assistive device: [x]   None  []  Walker     []  Crutches  []  Cane     []  Wheelchair  []  Stretcher    Left Shoulder Exam     Range of Motion   Active Abduction: 150   Forward Flexion: 160     Muscle Strength   Abduction: 4/5   Supraspinatus: 4/5     Other   Erythema: absent  Sensation: normal  Pulse: present                       ASSESSMENT:    Diagnoses and all orders for this visit:    Rotator cuff syndrome of left shoulder    Acute pain of left shoulder    Impingement syndrome, shoulder, left    Left cervical radiculopathy    Status post arthroscopy of shoulder    Other orders  -     topiramate (TOPAMAX) 25 MG capsule; Take 25 mg by mouth 2 (Two) Times a Day.  -     hydrochlorothiazide (MICROZIDE) 12.5 MG capsule; Take 12.5 mg by mouth Daily.          PLAN    Status post arthroscopy of the left shoulder with Concha procedure and subacromial decompression.    Plan is to continue with strengthening and conditioning exercises.  Continue to slowly progress activity as pain allows.  Anticipate return to work in the next 4-6 weeks.    Patient's Body mass index is 35.67 kg/m². BMI is above normal parameters. Recommendations include: exercise counseling and nutrition counseling.      Return in about 6 weeks (around 8/21/2018) for recheck.    Aaron Tan MD

## 2018-07-17 DIAGNOSIS — J45.41 MODERATE PERSISTENT ASTHMA WITH ACUTE EXACERBATION: ICD-10-CM

## 2018-07-17 RX ORDER — ALBUTEROL SULFATE 90 UG/1
AEROSOL, METERED RESPIRATORY (INHALATION)
Qty: 18 G | Refills: 0 | Status: SHIPPED | OUTPATIENT
Start: 2018-07-17 | End: 2019-03-25 | Stop reason: SDUPTHER

## 2018-07-19 ENCOUNTER — TELEPHONE (OUTPATIENT)
Dept: ORTHOPEDIC SURGERY | Facility: CLINIC | Age: 48
End: 2018-07-19

## 2018-07-19 NOTE — TELEPHONE ENCOUNTER
CALLING NEEDING A WORK STATEMENT TO GO BACK TO WORK ON Monday July 23 WITHOUT RESTRICTIONS. THERAPY SAID SHE COULD GO BACK TO WORK.   DR JACQUES  [phone]

## 2018-08-02 ENCOUNTER — OFFICE VISIT (OUTPATIENT)
Dept: PULMONOLOGY | Facility: CLINIC | Age: 48
End: 2018-08-02

## 2018-08-02 VITALS
DIASTOLIC BLOOD PRESSURE: 88 MMHG | OXYGEN SATURATION: 98 % | HEIGHT: 62 IN | BODY MASS INDEX: 36.49 KG/M2 | SYSTOLIC BLOOD PRESSURE: 128 MMHG | HEART RATE: 105 BPM | WEIGHT: 198.3 LBS

## 2018-08-02 DIAGNOSIS — J45.30 MILD PERSISTENT ASTHMA WITHOUT COMPLICATION: Primary | ICD-10-CM

## 2018-08-02 DIAGNOSIS — J30.1 CHRONIC SEASONAL ALLERGIC RHINITIS DUE TO POLLEN: ICD-10-CM

## 2018-08-02 DIAGNOSIS — K21.9 GASTROESOPHAGEAL REFLUX DISEASE, ESOPHAGITIS PRESENCE NOT SPECIFIED: ICD-10-CM

## 2018-08-02 DIAGNOSIS — E66.09 CLASS 2 OBESITY DUE TO EXCESS CALORIES WITHOUT SERIOUS COMORBIDITY WITH BODY MASS INDEX (BMI) OF 35.0 TO 35.9 IN ADULT: ICD-10-CM

## 2018-08-02 PROCEDURE — 99214 OFFICE O/P EST MOD 30 MIN: CPT | Performed by: INTERNAL MEDICINE

## 2018-08-02 NOTE — PROGRESS NOTES
Pulmonary Office Follow-up    Subjective     Lora Steiner is seen today at the office for   Chief Complaint   Patient presents with   • Asthma     3 MO F/U         HPI  Lora Steiner is a 47 y.o. female with a PMH significant for asthma, anemia, and back pain who presents for follow-up of asthma.  She was last seen 4/27/18, at which time she was doing well on Breo 100 to recommend we continue it given that we were in the midst of allergy season.  I did recommend that she try stopping her Zyrtec to continue on with Singulair and Flonase daily.  She reports that she is been doing well her Breo and has not needed her albuterol since her last visit.  She did develop a sore throat as well as ear pain so she saw her PCP who started her on amoxicillin and recommended she restart Flonase.  Otherwise, she does continue to have symptoms of heartburn despite using 40 mg of omeprazole daily and avoiding trigger foods.  She denies any nighttime awakenings with heartburn, right she does notice a constant burning throughout the day.  She has been followed by Dr. Patterson in [place] but she has not seen him recently regarding her issues.      Patient Active Problem List   Diagnosis   • Abdominal pain   • Abnormal mammogram   • Back pain   • Skin sensation disturbance   • Abnormal radiographic examination   • Otalgia   • Allergic rhinitis   • Mild persistent asthma without complication   • Class 2 obesity due to excess calories without serious comorbidity with body mass index (BMI) of 35.0 to 35.9 in adult   • Left shoulder pain   • Impingement syndrome, shoulder, left   • Rotator cuff syndrome of left shoulder   • Left cervical radiculopathy   • Acute pain of left shoulder   • Neck pain   • Dizzy spells   • Dysfunction of both eustachian tubes   • Low iron   • Status post arthroscopy of shoulder   • Chronic seasonal allergic rhinitis due to pollen   • Gastroesophageal reflux disease       Review of Systems  Review of  Systems   Constitutional: Negative for fever.   HENT: Positive for congestion, ear pain and sore throat. Negative for postnasal drip.    Respiratory: Negative for cough, shortness of breath and wheezing.    Cardiovascular: Negative for chest pain and leg swelling.   Gastrointestinal:        Heartburn     As described in the HPI. Otherwise, remainder of ROS (14 systems) were negative.    Medications, Allergies, Social, and Family Histories reviewed as per EMR.    Objective     Vitals:    08/02/18 1438   BP: 128/88   Pulse: 105   SpO2: 98%     Physical Exam   Constitutional: She is oriented to person, place, and time. Vital signs are normal. She appears well-developed and well-nourished.   HENT:   Head: Normocephalic and atraumatic.   Nose: No mucosal edema.   Mouth/Throat: Uvula is midline, oropharynx is clear and moist and mucous membranes are normal.   Mallampati 3   Eyes: Pupils are equal, round, and reactive to light. Conjunctivae, EOM and lids are normal.   Neck: Trachea normal and normal range of motion. No tracheal tenderness present. No thyroid mass present.   Cardiovascular: Normal rate, regular rhythm and normal heart sounds.  PMI is not displaced.  Exam reveals no gallop.    No murmur heard.  Pulmonary/Chest: Effort normal. No respiratory distress. She has no decreased breath sounds. She has no wheezes. She has no rhonchi. She exhibits no tenderness.   Abdominal: Soft. Normal appearance. There is no hepatomegaly.   Musculoskeletal:   Normal gait, no extremity edema     Vascular Status -  Her right foot exhibits no edema. Her left foot exhibits no edema.  Lymphadenopathy:        Head (right side): No submandibular adenopathy present.        Head (left side): No submandibular adenopathy present.     She has no cervical adenopathy.        Right: No supraclavicular adenopathy present.        Left: No supraclavicular adenopathy present.   Neurological: She is alert and oriented to person, place, and time.    Skin: Skin is warm and dry. No cyanosis. Nails show no clubbing.   Psychiatric: She has a normal mood and affect. Her speech is normal and behavior is normal. Judgment normal.   Nursing note and vitals reviewed.          Assessment/Plan     Lora was seen today for asthma.    Diagnoses and all orders for this visit:    Mild persistent asthma without complication  -     Fluticasone Furoate 100 MCG/ACT aerosol powder ; Inhale 1 puff Daily.    Chronic seasonal allergic rhinitis due to pollen    Class 2 obesity due to excess calories without serious comorbidity with body mass index (BMI) of 35.0 to 35.9 in adult    Gastroesophageal reflux disease, esophagitis presence not specified         Discussion/ Recommendations:   She continues to do well on the Breo 100 so I think it is time to try further de-escalation to an ICS only.  She is having some more active allergy symptoms with the season changes, but admits that this occurred after stopping her Flonase.  I am concerned that she continues to have symptoms of heartburn despite watching her diet as well as being on a daily PPI.    -Complete current Breo inhaler then start Arnuity 100 daily.  Counseled her that if she notes worsening of her breathing with changing to the Arnuity, she should go back to the Breo daily.  -Albuterol as needed.  -Encouraged her to continue daily Flonase and Zyrtec.  -Up to date with flu vaccine.  -Trigger avoidance.  -Continue Prilosec 40 mg daily and avoid trigger foods.  Recommended that she discuss her ongoing issues with heartburn with her PCP to consider a different PPI versus going back to see her gastroenterologist in [place].    Patient's Body mass index is 36.27 kg/m². BMI is above normal parameters. Follow-up plan includes:  referral to primary care.    Return in about 3 months (around 11/2/2018) for Recheck.          This document has been electronically signed by Clau Barlow MD on August 2, 2018 2:54 PM      Dragon  dictation used

## 2018-11-01 NOTE — PROGRESS NOTES
Pulmonary Office Follow-up    Subjective     Lora Steiner is seen today at the office for   Chief Complaint   Patient presents with   • Asthma   • Follow-up     3 month         HPI  Lora Steiner is a 47 y.o. female with a PMH significant for asthma, anemia, obesity, GERD, and back pain who presents for follow-up of asthma.  She was last seen 8/2/18, at which time I recommended de-escalating from Breo to Arnuity daily, but encouraged her to continue with her daily Flonase and Zyrtec.  She did complain of persistent issues with reflux so I recommended that she consider a reevaluation with her gastroenterologist in [place].  She states that she did not notice any changes when she went from Breo to Arnuity.  Patient has used her albuterol a few times, but it was after exposure to cleaning chemical which she knows causes issues with her breathing.  Otherwise, she's had a little bit more congestion and drainage as well as a sore throat.  She started back on her Flonase on a daily basis.  She denies any recent illnesses, cough, sputum, or chest pain.  She did discuss her ongoing issues with reflux to her PCP who changed her Prilosec to hypertonic since she is having fewer symptoms.  Patient still has some reflux but is not as bad as it was before.  Patient has been able to lose around 8 pounds since her last visit with me.  She is up-to-date with her flu vaccine.    Patient Active Problem List   Diagnosis   • Abdominal pain   • Abnormal mammogram   • Back pain   • Skin sensation disturbance   • Abnormal radiographic examination   • Otalgia   • Allergic rhinitis   • Mild persistent asthma without complication   • Class 2 obesity due to excess calories without serious comorbidity with body mass index (BMI) of 35.0 to 35.9 in adult   • Left shoulder pain   • Impingement syndrome, shoulder, left   • Rotator cuff syndrome of left shoulder   • Left cervical radiculopathy   • Acute pain of left shoulder   • Neck pain    • Dizzy spells   • Dysfunction of both eustachian tubes   • Low iron   • Status post arthroscopy of shoulder   • Chronic seasonal allergic rhinitis due to pollen   • Gastroesophageal reflux disease       Review of Systems  Review of Systems   Constitutional: Negative for fever.   HENT: Positive for congestion, postnasal drip and sore throat.    Respiratory: Negative for cough, shortness of breath and wheezing.    Cardiovascular: Negative for chest pain and leg swelling.   Gastrointestinal:        Heartburn     As described in the HPI. Otherwise, remainder of ROS (14 systems) were negative.    Medications, Allergies, Social, and Family Histories reviewed as per EMR.    Objective     Vitals:    11/02/18 1501   BP: 126/85   Pulse: 93   SpO2: 98%     Physical Exam   Constitutional: She is oriented to person, place, and time. Vital signs are normal. She appears well-developed and well-nourished.   HENT:   Head: Normocephalic and atraumatic.   Nose: Nose normal.   Mouth/Throat: Uvula is midline, oropharynx is clear and moist and mucous membranes are normal.   Mallampati 3   Eyes: Pupils are equal, round, and reactive to light. Conjunctivae, EOM and lids are normal.   Neck: Trachea normal and normal range of motion. No tracheal tenderness present. No thyroid mass present.   Cardiovascular: Normal rate, regular rhythm and normal heart sounds.  PMI is not displaced.  Exam reveals no gallop.    No murmur heard.  Pulmonary/Chest: Effort normal. No respiratory distress. She has no decreased breath sounds. She has no wheezes. She has no rhonchi. She exhibits no tenderness.   Abdominal: Soft. Normal appearance. There is no hepatomegaly.   Musculoskeletal:   Normal gait, no extremity edema     Vascular Status -  Her right foot exhibits no edema. Her left foot exhibits no edema.  Lymphadenopathy:        Head (right side): No submandibular adenopathy present.        Head (left side): No submandibular adenopathy present.     She  has no cervical adenopathy.        Right: No supraclavicular adenopathy present.        Left: No supraclavicular adenopathy present.   Neurological: She is alert and oriented to person, place, and time.   Skin: Skin is warm and dry. No cyanosis. Nails show no clubbing.   Psychiatric: She has a normal mood and affect. Her speech is normal and behavior is normal. Judgment normal.   Nursing note and vitals reviewed.          Assessment/Plan     Lora was seen today for asthma and follow-up.    Diagnoses and all orders for this visit:    Mild persistent asthma without complication    Chronic seasonal allergic rhinitis due to pollen    Class 2 obesity due to excess calories without serious comorbidity with body mass index (BMI) of 35.0 to 35.9 in adult    Gastroesophageal reflux disease, esophagitis presence not specified         Discussion/ Recommendations:   She has done well with further escalation down to an ICS only.  At this point, even her stability, I would recommend that she consider further de-escalation by stopping her ICS and only using albuterol as needed.  Certainly, if she notes worsening of her breathing or increased albuterol use, she should go back to the Arnuity daily.  Otherwise, she is having some mild allergic rhinitis symptoms which are well controlled with using Flonase, and her reflux is better since changing to pantoprazole.    -Continue Arnuity daily.  If she remains stable, I recommended that she try stopping the Arnuity and only using albuterol as needed.  -Albuterol as needed for dyspnea or wheeze..  -Encouraged her to continue daily Flonase and Zyrtec for the next few weeks and she can try stopping the Flonase again.  -Trigger avoidance.  -Continue pantoprazole daily.  If reflux symptoms persist or certainly if they worsen I recommend that she schedule a follow-up with Dr. Patterson in [place]  -Up to date with flu vaccine.    Patient's Body mass index is 34.81 kg/m². BMI is above normal  parameters. Follow-up plan includes:  referral to primary care.    Return in about 4 months (around 3/2/2019) for Recheck asthma.          This document has been electronically signed by Clau Barlow MD on November 2, 2018 3:17 PM      Dragon dictation used

## 2018-11-02 ENCOUNTER — OFFICE VISIT (OUTPATIENT)
Dept: PULMONOLOGY | Facility: CLINIC | Age: 48
End: 2018-11-02

## 2018-11-02 VITALS
SYSTOLIC BLOOD PRESSURE: 126 MMHG | WEIGHT: 190.3 LBS | OXYGEN SATURATION: 98 % | HEART RATE: 93 BPM | DIASTOLIC BLOOD PRESSURE: 85 MMHG | HEIGHT: 62 IN | BODY MASS INDEX: 35.02 KG/M2

## 2018-11-02 DIAGNOSIS — E66.09 CLASS 2 OBESITY DUE TO EXCESS CALORIES WITHOUT SERIOUS COMORBIDITY WITH BODY MASS INDEX (BMI) OF 35.0 TO 35.9 IN ADULT: ICD-10-CM

## 2018-11-02 DIAGNOSIS — J45.30 MILD PERSISTENT ASTHMA WITHOUT COMPLICATION: Primary | ICD-10-CM

## 2018-11-02 DIAGNOSIS — K21.9 GASTROESOPHAGEAL REFLUX DISEASE, ESOPHAGITIS PRESENCE NOT SPECIFIED: ICD-10-CM

## 2018-11-02 DIAGNOSIS — J30.1 CHRONIC SEASONAL ALLERGIC RHINITIS DUE TO POLLEN: ICD-10-CM

## 2018-11-02 PROCEDURE — 99214 OFFICE O/P EST MOD 30 MIN: CPT | Performed by: INTERNAL MEDICINE

## 2018-11-02 RX ORDER — MELOXICAM 15 MG/1
15 TABLET ORAL DAILY
Refills: 4 | COMMUNITY
Start: 2018-10-27

## 2018-11-02 RX ORDER — PANTOPRAZOLE SODIUM 40 MG/1
TABLET, DELAYED RELEASE ORAL
Refills: 11 | COMMUNITY
Start: 2018-10-15

## 2018-11-02 RX ORDER — ERGOCALCIFEROL 1.25 MG/1
50000 CAPSULE ORAL
COMMUNITY
Start: 2018-06-28 | End: 2019-06-29

## 2018-11-26 DIAGNOSIS — J45.30 MILD PERSISTENT ASTHMA WITHOUT COMPLICATION: ICD-10-CM

## 2018-11-28 RX ORDER — FLUTICASONE PROPIONATE 50 MCG
SPRAY, SUSPENSION (ML) NASAL
Qty: 48 ML | Refills: 0 | Status: SHIPPED | OUTPATIENT
Start: 2018-11-28 | End: 2019-03-25

## 2018-11-28 RX ORDER — FLUTICASONE PROPIONATE 50 UG/1
SPRAY, METERED NASAL
Qty: 9.9 ML | Refills: 0 | Status: SHIPPED | OUTPATIENT
Start: 2018-11-28 | End: 2018-11-28 | Stop reason: SDUPTHER

## 2019-01-02 RX ORDER — FLUTICASONE PROPIONATE 50 MCG
SPRAY, SUSPENSION (ML) NASAL
Qty: 16 ML | Refills: 0 | Status: SHIPPED | OUTPATIENT
Start: 2019-01-02 | End: 2019-03-25

## 2019-02-17 DIAGNOSIS — J45.41 MODERATE PERSISTENT ASTHMA WITH ACUTE EXACERBATION: ICD-10-CM

## 2019-02-18 RX ORDER — MONTELUKAST SODIUM 10 MG/1
TABLET ORAL
Qty: 90 TABLET | Refills: 0 | Status: SHIPPED | OUTPATIENT
Start: 2019-02-18 | End: 2019-03-16 | Stop reason: SDUPTHER

## 2019-03-16 DIAGNOSIS — J45.41 MODERATE PERSISTENT ASTHMA WITH ACUTE EXACERBATION: ICD-10-CM

## 2019-03-18 RX ORDER — MONTELUKAST SODIUM 10 MG/1
TABLET ORAL
Qty: 90 TABLET | Refills: 0 | Status: SHIPPED | OUTPATIENT
Start: 2019-03-18 | End: 2019-07-21 | Stop reason: SDUPTHER

## 2019-03-25 ENCOUNTER — OFFICE VISIT (OUTPATIENT)
Dept: PULMONOLOGY | Facility: CLINIC | Age: 49
End: 2019-03-25

## 2019-03-25 VITALS
HEART RATE: 80 BPM | SYSTOLIC BLOOD PRESSURE: 116 MMHG | HEIGHT: 62 IN | DIASTOLIC BLOOD PRESSURE: 80 MMHG | BODY MASS INDEX: 33.68 KG/M2 | WEIGHT: 183 LBS | OXYGEN SATURATION: 99 %

## 2019-03-25 DIAGNOSIS — J30.1 CHRONIC SEASONAL ALLERGIC RHINITIS DUE TO POLLEN: ICD-10-CM

## 2019-03-25 DIAGNOSIS — J45.20 MILD INTERMITTENT ASTHMA WITHOUT COMPLICATION: Primary | ICD-10-CM

## 2019-03-25 DIAGNOSIS — E66.09 CLASS 1 OBESITY DUE TO EXCESS CALORIES WITHOUT SERIOUS COMORBIDITY WITH BODY MASS INDEX (BMI) OF 34.0 TO 34.9 IN ADULT: ICD-10-CM

## 2019-03-25 PROBLEM — J45.30 MILD PERSISTENT ASTHMA WITHOUT COMPLICATION: Status: RESOLVED | Noted: 2017-04-04 | Resolved: 2019-03-25

## 2019-03-25 PROCEDURE — 99213 OFFICE O/P EST LOW 20 MIN: CPT | Performed by: INTERNAL MEDICINE

## 2019-03-25 RX ORDER — FLUTICASONE PROPIONATE 50 MCG
1 SPRAY, SUSPENSION (ML) NASAL 2 TIMES DAILY
Qty: 16 ML | Refills: 11 | OUTPATIENT
Start: 2019-03-25 | End: 2020-01-08

## 2019-03-25 RX ORDER — ALBUTEROL SULFATE 90 UG/1
2 AEROSOL, METERED RESPIRATORY (INHALATION) EVERY 4 HOURS PRN
Qty: 18 G | Refills: 11 | Status: SHIPPED | OUTPATIENT
Start: 2019-03-25 | End: 2019-08-12 | Stop reason: SDUPTHER

## 2019-03-25 RX ORDER — POTASSIUM CHLORIDE 750 MG/1
10 TABLET, EXTENDED RELEASE ORAL DAILY
COMMUNITY
Start: 2019-01-08

## 2019-03-25 RX ORDER — AMITRIPTYLINE HYDROCHLORIDE 10 MG/1
TABLET, FILM COATED ORAL
Refills: 5 | COMMUNITY
Start: 2019-03-16 | End: 2020-01-08

## 2019-03-25 NOTE — PROGRESS NOTES
Pulmonary Office Follow-up    Subjective     Lora Steiner is seen today at the office for   Chief Complaint   Patient presents with   • Asthma         HPI  Lora Steiner is a 48 y.o. female with a PMH significant for asthma, anemia, obesity, GERD, and back pain who presents for follow-up of asthma.  She was last seen 11/2/18, at which time she was doing well on Arnuity daily, so I recommended if she remains stable to try stopping Arnuity and using albuterol only as needed.  She states that she was able to stop the Arnuity and she has infrequent albuterol use.  Patient complains that she has had a persistent sore throat and was seen by the nurse practitioner her work who recommended that she follow-up with me.  Patient denies significant nasal congestion, but she has had some episodes of epistaxis.  She continues on Flonase 2 sprays daily as well as Zyrtec and Singulair daily.  Patient denies any fevers, chills, cough, or sputum.  She did recently travel to [place] and states that her symptoms improved while she was down there, but she did need albuterol when she is exposed to secondhand tobacco smoke.  Otherwise, she denies any recent exacerbations or steroid use.  She also denies any recent issues with heartburn.    Patient Active Problem List   Diagnosis   • Abdominal pain   • Abnormal mammogram   • Back pain   • Skin sensation disturbance   • Abnormal radiographic examination   • Otalgia   • Allergic rhinitis   • Class 1 obesity due to excess calories without serious comorbidity with body mass index (BMI) of 34.0 to 34.9 in adult   • Left shoulder pain   • Impingement syndrome, shoulder, left   • Rotator cuff syndrome of left shoulder   • Left cervical radiculopathy   • Acute pain of left shoulder   • Neck pain   • Dizzy spells   • Dysfunction of both eustachian tubes   • Low iron   • Status post arthroscopy of shoulder   • Chronic seasonal allergic rhinitis due to pollen   • Gastroesophageal reflux  disease   • Mild intermittent asthma without complication       Review of Systems  Review of Systems   Constitutional: Negative for fever.   HENT: Positive for postnasal drip and sore throat. Negative for congestion.    Respiratory: Negative for cough, shortness of breath and wheezing.    Cardiovascular: Negative for chest pain and leg swelling.     As described in the HPI. Otherwise, remainder of ROS (14 systems) were negative.    Medications, Allergies, Social, and Family Histories reviewed as per EMR.    Objective     Vitals:    03/25/19 1528   BP: 116/80   Pulse: 80   SpO2: 99%     Physical Exam   Constitutional: She is oriented to person, place, and time. Vital signs are normal. She appears well-developed and well-nourished.   HENT:   Head: Normocephalic and atraumatic.   Nose: Mucosal edema present.   Mouth/Throat: Uvula is midline, oropharynx is clear and moist and mucous membranes are normal.   Mallampati 3   Eyes: Conjunctivae, EOM and lids are normal. Pupils are equal, round, and reactive to light.   Neck: Trachea normal and normal range of motion. No tracheal tenderness present. No thyroid mass present.   Cardiovascular: Normal rate, regular rhythm and normal heart sounds. PMI is not displaced. Exam reveals no gallop.   No murmur heard.  Pulmonary/Chest: Effort normal. No respiratory distress. She has no decreased breath sounds. She has no wheezes. She has no rhonchi. She exhibits no tenderness.   Abdominal: Soft. Normal appearance. There is no hepatomegaly.   Musculoskeletal:   Normal gait, no extremity edema     Vascular Status -  Her right foot exhibits no edema. Her left foot exhibits no edema.  Lymphadenopathy:        Head (right side): No submandibular adenopathy present.        Head (left side): No submandibular adenopathy present.     She has no cervical adenopathy.        Right: No supraclavicular adenopathy present.        Left: No supraclavicular adenopathy present.   Neurological: She is alert  and oriented to person, place, and time.   Skin: Skin is warm and dry. No cyanosis. Nails show no clubbing.   Psychiatric: She has a normal mood and affect. Her speech is normal and behavior is normal. Judgment normal.   Nursing note and vitals reviewed.          Assessment/Plan     Lora was seen today for asthma.    Diagnoses and all orders for this visit:    Mild intermittent asthma without complication  -     albuterol sulfate HFA (VENTOLIN HFA) 108 (90 Base) MCG/ACT inhaler; Inhale 2 puffs Every 4 (Four) Hours As Needed for Wheezing or Shortness of Air.    Chronic seasonal allergic rhinitis due to pollen  -     fluticasone (FLONASE) 50 MCG/ACT nasal spray; 1 spray by Each Nare route 2 (Two) Times a Day. Shake liquid    Class 1 obesity due to excess calories without serious comorbidity with body mass index (BMI) of 34.0 to 34.9 in adult         Discussion/ Recommendations:   She is doing well from an asthma standpoint and is tolerated de-escalation to albuterol only as needed.  I think her complaint of sore throat is related to some rhinitis which is likely driven by dry weather in the setting of using her multiple allergy medications.  I encouraged her to change her Flonase to 1 spray twice daily and to start using frequent nasal saline.  Would also be okay if she wanted to try changing her cetirizine to fexofenadine for the time being to see if she gets any additional benefit.    -Albuterol as needed for dyspnea or wheeze.  -If she has increased albuterol use more than 4 times a week, she is to restart Arnuity on a daily basis.  -Change Flonase to 1 spray twice daily.  Recommend that she start using nasal saline at least twice daily prior to Flonase, but she can use it more frequently if needed.  -Continue on cetirizine and Singulair daily.  -Trigger avoidance.  -Continue pantoprazole daily.   -Up to date with flu vaccine.    Patient's Body mass index is 33.47 kg/m². BMI is above normal parameters.  Recommendations include: exercise counseling.      Return in about 3 months (around 6/25/2019) for Recheck asthma.          This document has been electronically signed by Clau Barlow MD on March 25, 2019 4:36 PM      [place] dictation used

## 2019-07-21 DIAGNOSIS — J45.41 MODERATE PERSISTENT ASTHMA WITH ACUTE EXACERBATION: ICD-10-CM

## 2019-07-22 RX ORDER — MONTELUKAST SODIUM 10 MG/1
TABLET ORAL
Qty: 90 TABLET | Refills: 0 | Status: SHIPPED | OUTPATIENT
Start: 2019-07-22 | End: 2019-08-12 | Stop reason: SDUPTHER

## 2019-08-12 ENCOUNTER — OFFICE VISIT (OUTPATIENT)
Dept: PULMONOLOGY | Facility: CLINIC | Age: 49
End: 2019-08-12

## 2019-08-12 VITALS
DIASTOLIC BLOOD PRESSURE: 87 MMHG | OXYGEN SATURATION: 98 % | HEIGHT: 62 IN | BODY MASS INDEX: 34.24 KG/M2 | SYSTOLIC BLOOD PRESSURE: 128 MMHG | WEIGHT: 186.1 LBS | HEART RATE: 88 BPM

## 2019-08-12 DIAGNOSIS — R07.89 MUSCULOSKELETAL CHEST PAIN: ICD-10-CM

## 2019-08-12 DIAGNOSIS — J45.20 MILD INTERMITTENT ASTHMA WITHOUT COMPLICATION: Primary | ICD-10-CM

## 2019-08-12 DIAGNOSIS — J45.41 MODERATE PERSISTENT ASTHMA WITH ACUTE EXACERBATION: ICD-10-CM

## 2019-08-12 DIAGNOSIS — E66.09 CLASS 1 OBESITY DUE TO EXCESS CALORIES WITHOUT SERIOUS COMORBIDITY WITH BODY MASS INDEX (BMI) OF 33.0 TO 33.9 IN ADULT: ICD-10-CM

## 2019-08-12 DIAGNOSIS — K21.9 GASTROESOPHAGEAL REFLUX DISEASE, ESOPHAGITIS PRESENCE NOT SPECIFIED: ICD-10-CM

## 2019-08-12 DIAGNOSIS — J30.1 CHRONIC SEASONAL ALLERGIC RHINITIS DUE TO POLLEN: ICD-10-CM

## 2019-08-12 PROCEDURE — 99214 OFFICE O/P EST MOD 30 MIN: CPT | Performed by: INTERNAL MEDICINE

## 2019-08-12 RX ORDER — ALBUTEROL SULFATE 90 UG/1
2 AEROSOL, METERED RESPIRATORY (INHALATION) EVERY 4 HOURS PRN
Qty: 18 G | Refills: 11 | Status: SHIPPED | OUTPATIENT
Start: 2019-08-12 | End: 2021-06-29 | Stop reason: SDUPTHER

## 2019-08-12 RX ORDER — MONTELUKAST SODIUM 10 MG/1
10 TABLET ORAL
Qty: 90 TABLET | Refills: 4 | Status: SHIPPED | OUTPATIENT
Start: 2019-08-12 | End: 2021-06-29 | Stop reason: SDUPTHER

## 2019-08-12 RX ORDER — ACYCLOVIR 200 MG/1
200 CAPSULE ORAL AS NEEDED
Refills: 5 | COMMUNITY
Start: 2019-06-14 | End: 2020-01-08

## 2019-08-12 NOTE — PROGRESS NOTES
Pulmonary Office Follow-up    Subjective     Lora Steiner is seen today at the office for   Chief Complaint   Patient presents with   • Asthma     follow-up         HPI  Lora Steiner is a 48 y.o. female with a PMH significant for asthma, anemia, obesity, GERD, and back pain who presents for follow-up of asthma.  She was last seen 3/25/19, at which time she continued to do well with infrequent albuterol use but she was having some increased seasonal rhinitis I recommended changing her Flonase to twice daily we will continue on cetirizine and Singulair.  She states she has been doing well from a asthma standpoint and rarely needs her albuterol.  Also, her allergies are well controlled on Singulair but she does not require her Flonase on a daily basis.  Patient states that she started having left rib and chest heaviness while at work.  She also had associated nausea so she went to the nurse but was told that her blood pressure was controlled.  She thinks it could be a muscular strain as she has had issues with her left shoulder and had been doing some repetitive motion with her job.  She has not been doing that motion and with symptomatic treatment of her pain, it is improved.  She has some nonproductive cough, but denies any wheeze, sputum, or fevers.  Patient states that her heartburn is well controlled on Protonix.    Patient Active Problem List   Diagnosis   • Abdominal pain   • Abnormal mammogram   • Back pain   • Skin sensation disturbance   • Abnormal radiographic examination   • Otalgia   • Allergic rhinitis   • Class 1 obesity due to excess calories without serious comorbidity with body mass index (BMI) of 33.0 to 33.9 in adult   • Left shoulder pain   • Impingement syndrome, shoulder, left   • Rotator cuff syndrome of left shoulder   • Left cervical radiculopathy   • Acute pain of left shoulder   • Neck pain   • Dizzy spells   • Dysfunction of both eustachian tubes   • Low iron   • Status post  arthroscopy of shoulder   • Chronic seasonal allergic rhinitis due to pollen   • Gastroesophageal reflux disease   • Mild intermittent asthma without complication       Review of Systems  Review of Systems   Constitutional: Negative for fever.   HENT: Negative for congestion and postnasal drip.    Respiratory: Negative for cough, shortness of breath and wheezing.    Cardiovascular: Positive for chest pain. Negative for leg swelling.     As described in the HPI. Otherwise, remainder of ROS (14 systems) were negative.    Medications, Allergies, Social, and Family Histories reviewed as per EMR.    Objective     Vitals:    08/12/19 1534   BP: 128/87   Pulse: 88   SpO2: 98%     Physical Exam   Constitutional: She is oriented to person, place, and time. Vital signs are normal. She appears well-developed and well-nourished.   HENT:   Head: Normocephalic and atraumatic.   Nose: No mucosal edema or rhinorrhea.   Mouth/Throat: Uvula is midline, oropharynx is clear and moist and mucous membranes are normal.   Mallampati 3   Eyes: Conjunctivae, EOM and lids are normal. Pupils are equal, round, and reactive to light.   Neck: Trachea normal and normal range of motion. No tracheal tenderness present. No thyroid mass present.   Cardiovascular: Normal rate, regular rhythm and normal heart sounds. PMI is not displaced. Exam reveals no gallop.   No murmur heard.  Pulmonary/Chest: Effort normal. No respiratory distress. She has no decreased breath sounds. She has no wheezes. She has no rhonchi. She exhibits no tenderness.   Tenderness left thoracic paraspinal region   Abdominal: Soft. Normal appearance. There is no hepatomegaly.   Musculoskeletal:   Normal gait, no extremity edema     Vascular Status -  Her right foot exhibits no edema. Her left foot exhibits no edema.  Lymphadenopathy:        Head (right side): No submandibular adenopathy present.        Head (left side): No submandibular adenopathy present.     She has no cervical  adenopathy.        Right: No supraclavicular adenopathy present.        Left: No supraclavicular adenopathy present.   Neurological: She is alert and oriented to person, place, and time.   Skin: Skin is warm and dry. No cyanosis. Nails show no clubbing.   Psychiatric: She has a normal mood and affect. Her speech is normal and behavior is normal. Judgment normal.   Nursing note and vitals reviewed.          Assessment/Plan     Lora was seen today for asthma.    Diagnoses and all orders for this visit:    Mild intermittent asthma without complication  -     albuterol sulfate HFA (VENTOLIN HFA) 108 (90 Base) MCG/ACT inhaler; Inhale 2 puffs Every 4 (Four) Hours As Needed for Wheezing or Shortness of Air.    Chronic seasonal allergic rhinitis due to pollen    Class 1 obesity due to excess calories without serious comorbidity with body mass index (BMI) of 33.0 to 33.9 in adult    Gastroesophageal reflux disease, esophagitis presence not specified    Moderate persistent asthma with acute exacerbation  -     montelukast (SINGULAIR) 10 MG tablet; Take 1 tablet by mouth every night at bedtime.    Musculoskeletal chest pain         Discussion/ Recommendations:   She has been doing well from an asthma standpoint and her allergies are well controlled.  I think that she is likely having musculoskeletal pain from repetitive motions at work, as opposed to underlying pleurisy.  Her reflux is well controlled.    -Use albuterol as needed for dyspnea or wheeze.  -If she has increased albuterol use more than 4 times a week, she is to restart Arnuity on a daily basis.  -Continue Flonase and frequent nasal saline.  -Continue on cetirizine and Singulair daily.  -Supportive care for musculoskeletal pain.  Suggested that she try heating pad and use Tylenol as needed.  -Trigger avoidance.  -Continue pantoprazole daily.   -Encouraged her to get annual flu vaccine when available.    Patient's Body mass index is 34.04 kg/m². BMI is above  normal parameters. Recommendations include: exercise counseling.      Return in about 6 months (around 2/12/2020) for Recheck asthma.          This document has been electronically signed by Clau Barlow MD on August 12, 2019 4:07 PM      Dragon dictation used

## 2020-03-12 ENCOUNTER — OFFICE VISIT (OUTPATIENT)
Dept: PULMONOLOGY | Facility: CLINIC | Age: 50
End: 2020-03-12

## 2020-03-12 VITALS
BODY MASS INDEX: 35.7 KG/M2 | OXYGEN SATURATION: 99 % | DIASTOLIC BLOOD PRESSURE: 82 MMHG | WEIGHT: 194 LBS | HEIGHT: 62 IN | HEART RATE: 93 BPM | SYSTOLIC BLOOD PRESSURE: 126 MMHG

## 2020-03-12 DIAGNOSIS — J06.9 UPPER RESPIRATORY TRACT INFECTION, UNSPECIFIED TYPE: Primary | ICD-10-CM

## 2020-03-12 DIAGNOSIS — J45.20 MILD INTERMITTENT ASTHMA WITHOUT COMPLICATION: ICD-10-CM

## 2020-03-12 DIAGNOSIS — J30.1 CHRONIC SEASONAL ALLERGIC RHINITIS DUE TO POLLEN: ICD-10-CM

## 2020-03-12 DIAGNOSIS — K21.9 GASTROESOPHAGEAL REFLUX DISEASE WITHOUT ESOPHAGITIS: ICD-10-CM

## 2020-03-12 DIAGNOSIS — E66.09 CLASS 2 OBESITY DUE TO EXCESS CALORIES WITHOUT SERIOUS COMORBIDITY WITH BODY MASS INDEX (BMI) OF 35.0 TO 35.9 IN ADULT: ICD-10-CM

## 2020-03-12 PROCEDURE — 99214 OFFICE O/P EST MOD 30 MIN: CPT | Performed by: INTERNAL MEDICINE

## 2020-03-12 RX ORDER — FLUTICASONE PROPIONATE 50 MCG
2 SPRAY, SUSPENSION (ML) NASAL DAILY
COMMUNITY
End: 2021-06-29 | Stop reason: SDUPTHER

## 2020-03-12 NOTE — PROGRESS NOTES
Pulmonary Office Follow-up    Subjective     Lora Steiner is seen today at the office for   Chief Complaint   Patient presents with   • Asthma         HPI  Lora Steiner is a 49 y.o. female with a PMH significant for asthma, anemia, obesity, GERD, and back pain who presents for follow-up of asthma.    8/12/19: She continues to do well from an asthma standpoint with rare albuterol use.  Her allergies are also well controlled so I encouraged her to continue with her allergy regimen of cetirizine, montelukast, and Flonase.  She was having some musculoskeletal chest pain so I recommend conservative management.    3/12/20: She states she has been doing well with her albuterol use and no issues with her allergies or reflux.  Patient recently went to [place] and over the weekend she started having some upper respiratory symptoms.  She is also been experiencing some chest congestion and a nonproductive cough.  Patient does feel like she is been more out of breath with this episode so she has had to use her albuterol a few times.  She denies any fever or chills.  She does continue on her montelukast and Flonase.  Patient is taking her Protonix daily for her reflux.  She did get her flu vaccine.    Patient Active Problem List   Diagnosis   • Abdominal pain   • Abnormal mammogram   • Back pain   • Skin sensation disturbance   • Abnormal radiographic examination   • Otalgia   • Allergic rhinitis   • Class 2 obesity due to excess calories without serious comorbidity with body mass index (BMI) of 35.0 to 35.9 in adult   • Left shoulder pain   • Impingement syndrome, shoulder, left   • Rotator cuff syndrome of left shoulder   • Left cervical radiculopathy   • Acute pain of left shoulder   • Neck pain   • Dizzy spells   • Dysfunction of both eustachian tubes   • Low iron   • Status post arthroscopy of shoulder   • Chronic seasonal allergic rhinitis due to pollen   • Gastroesophageal reflux disease   • Mild intermittent  asthma without complication       Review of Systems  Review of Systems   Constitutional: Negative for fever and unexpected weight change.   HENT: Positive for congestion and postnasal drip. Negative for rhinorrhea.    Respiratory: Positive for cough, chest tightness and shortness of breath. Negative for wheezing.    Cardiovascular: Negative for leg swelling.     As described in the HPI. Otherwise, remainder of ROS (14 systems) were negative.    Medications, Allergies, Social, and Family Histories reviewed as per EMR.    Objective     Vitals:    03/12/20 1447   BP: 126/82   Pulse: 93   SpO2: 99%     Physical Exam   Constitutional: She is oriented to person, place, and time. Vital signs are normal. She appears well-developed and well-nourished.   HENT:   Head: Normocephalic and atraumatic.   Nose: No mucosal edema or rhinorrhea.   Mouth/Throat: Uvula is midline, oropharynx is clear and moist and mucous membranes are normal.   Mallampati 3   Eyes: Pupils are equal, round, and reactive to light. Conjunctivae, EOM and lids are normal.   Neck: Trachea normal and normal range of motion. No tracheal tenderness present. No thyroid mass present.   Cardiovascular: Normal rate, regular rhythm and normal heart sounds. PMI is not displaced. Exam reveals no gallop.   No murmur heard.  Pulmonary/Chest: Effort normal. No respiratory distress. She has no decreased breath sounds. She has no wheezes. She has no rhonchi. She exhibits no tenderness.   Abdominal: Soft. Normal appearance. There is no hepatomegaly.   Musculoskeletal:   Normal gait, no extremity edema     Vascular Status -  Her right foot exhibits no edema. Her left foot exhibits no edema.  Lymphadenopathy:        Head (right side): No submandibular adenopathy present.        Head (left side): No submandibular adenopathy present.     She has no cervical adenopathy.        Right: No supraclavicular adenopathy present.        Left: No supraclavicular adenopathy present.      Neurological: She is alert and oriented to person, place, and time.   Skin: Skin is warm and dry. No cyanosis. Nails show no clubbing.   Psychiatric: She has a normal mood and affect. Her speech is normal and behavior is normal. Judgment normal.   Nursing note and vitals reviewed.          Assessment/Plan     Lora was seen today for asthma.    Diagnoses and all orders for this visit:    Upper respiratory tract infection, unspecified type    Mild intermittent asthma without complication    Chronic seasonal allergic rhinitis due to pollen    Class 2 obesity due to excess calories without serious comorbidity with body mass index (BMI) of 35.0 to 35.9 in adult    Gastroesophageal reflux disease without esophagitis         Discussion/ Recommendations:   I think she is likely experiencing upper respiratory symptoms from allergies or possibly a viral infection.  Given relatively mild symptoms I recommended using an ICS for the next 2 weeks to help prevent a full exacerbation.  I do not feel that other therapies are necessary at this time.  I did  her that there are more recent recommendations suggesting we consider keeping mild intermittent asthmatics on a low-dose ICS, however, given how well she has been doing on just albuterol as needed I think is reasonable to go back to this if she can tolerate.    -Asmanex 100 HFA 2 puffs twice daily for 2 weeks.  As long as her symptoms have improved and she does well stopping Asmanex I do not feel is necessary to continue ICS.  -If her symptoms persist or she has frequent albuterol use, I would suggest starting on a chronic low-dose ICS.  -Use albuterol as needed for dyspnea or wheeze.  -Continue Flonase and frequent nasal saline.  -Continue on cetirizine and Singulair daily.  -Trigger avoidance.  -Continue pantoprazole daily.   -Up-to-date with a flu vaccine.    Patient's Body mass index is 35.48 kg/m². BMI is above normal parameters. Recommendations include: exercise  counseling.      Return in about 6 months (around 9/12/2020) for Recheck asthma.          This document has been electronically signed by Clau Barlow MD on March 12, 2020 15:03      Dragon dictation used

## 2020-09-11 NOTE — PROGRESS NOTES
Pulmonary Office Follow-up    Subjective     Lora Steiner is seen today at the office for   Chief Complaint   Patient presents with   • Asthma         HPI  Lora Steiner is a 49 y.o. female with a PMH significant for asthma, anemia, obesity, GERD, and back pain who presents for follow-up of asthma.    8/12/19: She continues to do well from an asthma standpoint with rare albuterol use.  Her allergies are also well controlled so I encouraged her to continue with her allergy regimen of cetirizine, montelukast, and Flonase.  She was having some musculoskeletal chest pain so I recommend conservative management.    3/12/20: She was having some upper respiratory symptoms likely from allergies so I recommended using her Asmanex for the next 2 weeks then if her symptoms had improved she could try stopping.  Otherwise, she was to continue on her allergy regimen of Flonase, cetirizine, and Singulair.    9/14/20: She states that she has had to use her albuterol a few times over the past month but not very often.  Her primary issue is some increased cough which she attributes to allergies.  She does continue on Zyrtec and Singulair, but she has not been using her Flonase as she is not having any rhinitis symptoms.  Patient does admit that she has several awakenings at night due to cough but she has not tried her inhaler.  She denies any sputum, fever, chills, or chest pain.  Patient has not had any recent exacerbations or steroid use.    Patient Active Problem List   Diagnosis   • Abdominal pain   • Abnormal mammogram   • Back pain   • Skin sensation disturbance   • Abnormal radiographic examination   • Otalgia   • Allergic rhinitis   • Class 2 obesity due to excess calories without serious comorbidity with body mass index (BMI) of 35.0 to 35.9 in adult   • Left shoulder pain   • Impingement syndrome, shoulder, left   • Rotator cuff syndrome of left shoulder   • Left cervical radiculopathy   • Acute pain of left  shoulder   • Neck pain   • Dizzy spells   • Dysfunction of both eustachian tubes   • Low iron   • Status post arthroscopy of shoulder   • Chronic seasonal allergic rhinitis due to pollen   • Gastroesophageal reflux disease   • Mild intermittent asthma without complication       Review of Systems  Review of Systems   Constitutional: Negative for fever and unexpected weight change.   HENT: Negative for congestion, postnasal drip and rhinorrhea.    Respiratory: Positive for cough and shortness of breath. Negative for wheezing.    Cardiovascular: Negative for leg swelling.     As described in the HPI. Otherwise, remainder of ROS (14 systems) were negative.    Medications, Allergies, Social, and Family Histories reviewed as per EMR.    Objective     Vitals:    09/14/20 1549   BP: 110/78   Pulse: 82   SpO2: 97%     Physical Exam   Constitutional: She is oriented to person, place, and time. She appears well-developed.   HENT:   Head: Normocephalic and atraumatic.   Eyes: Pupils are equal, round, and reactive to light. Conjunctivae and lids are normal.   Neck: Trachea normal and normal range of motion. No tracheal tenderness present. No thyroid mass present.   Cardiovascular: Normal rate, regular rhythm and normal heart sounds. PMI is not displaced. Exam reveals no gallop.   No murmur heard.  Pulmonary/Chest: Effort normal and breath sounds normal. No respiratory distress. She has no decreased breath sounds. She has no wheezes. She has no rhonchi. Chest wall is not dull to percussion. She exhibits no tenderness.   Abdominal: Soft. Normal appearance and bowel sounds are normal. There is no hepatomegaly. There is no abdominal tenderness.   Musculoskeletal:      Comments: Normal gait, no extremity edema     Vascular Status -  Her right foot exhibits no edema. Her left foot exhibits no edema.  Lymphadenopathy:        Head (right side): No submandibular adenopathy present.        Head (left side): No submandibular adenopathy  present.     She has no cervical adenopathy.        Right: No supraclavicular adenopathy present.        Left: No supraclavicular adenopathy present.   Neurological: She is alert and oriented to person, place, and time.   Skin: Skin is warm and dry. No rash noted. Nails show no clubbing.   Psychiatric: Her speech is normal and behavior is normal. Judgment normal.   Nursing note and vitals reviewed.          Assessment/Plan     Lora was seen today for asthma.    Diagnoses and all orders for this visit:    Mild intermittent asthma without complication  -     Mometasone Furoate 100 MCG/ACT aerosol; Inhale 2 puffs 2 (Two) Times a Day.    Chronic seasonal allergic rhinitis due to pollen    Class 2 obesity due to excess calories without serious comorbidity with body mass index (BMI) of 35.0 to 35.9 in adult         Discussion/ Recommendations:   I think she would benefit from going back on ICS as she is having some frequent nocturnal symptoms at this time.  I have counseled her that is like be advisable who is maintained on ICS long-term given the reduction in severe exacerbations.  Otherwise, she should restart Flonase if she has recurrence of her rhinitis.    -Restart Asmanex 100 HFA 2 puffs twice daily.  Rinse mouth after use.  Provided with information to get a co-pay card.  -Use albuterol as needed for dyspnea or wheeze.  -Restart Flonase and frequent nasal saline if rhinitis recurs.  -Continue on cetirizine and Singulair daily.  -Trigger avoidance.  -Recommend annual influenza vaccination at work.    Advised patient that if her symptoms do not improve over the next few weeks, she is to call the clinic for follow-up appointment.  Otherwise we will plan on a recheck in 6 months.    Patient's Body mass index is 36.21 kg/m². BMI is above normal parameters. Recommendations include: exercise counseling.      Return in about 6 months (around 3/14/2021) for Recheck asthma.          This document has been electronically  signed by Clau Barlow MD on September 14, 2020 16:13 CDT      Dragon dictation used

## 2020-09-14 ENCOUNTER — OFFICE VISIT (OUTPATIENT)
Dept: PULMONOLOGY | Facility: CLINIC | Age: 50
End: 2020-09-14

## 2020-09-14 VITALS
BODY MASS INDEX: 36.44 KG/M2 | OXYGEN SATURATION: 97 % | DIASTOLIC BLOOD PRESSURE: 78 MMHG | WEIGHT: 198 LBS | SYSTOLIC BLOOD PRESSURE: 110 MMHG | HEIGHT: 62 IN | HEART RATE: 82 BPM

## 2020-09-14 DIAGNOSIS — E66.09 CLASS 2 OBESITY DUE TO EXCESS CALORIES WITHOUT SERIOUS COMORBIDITY WITH BODY MASS INDEX (BMI) OF 35.0 TO 35.9 IN ADULT: ICD-10-CM

## 2020-09-14 DIAGNOSIS — J30.1 CHRONIC SEASONAL ALLERGIC RHINITIS DUE TO POLLEN: ICD-10-CM

## 2020-09-14 DIAGNOSIS — J45.20 MILD INTERMITTENT ASTHMA WITHOUT COMPLICATION: Primary | ICD-10-CM

## 2020-09-14 PROCEDURE — 99214 OFFICE O/P EST MOD 30 MIN: CPT | Performed by: INTERNAL MEDICINE

## 2020-09-15 ENCOUNTER — TELEPHONE (OUTPATIENT)
Dept: PULMONOLOGY | Facility: CLINIC | Age: 50
End: 2020-09-15

## 2020-09-15 RX ORDER — FLUTICASONE PROPIONATE 44 MCG
2 AEROSOL WITH ADAPTER (GRAM) INHALATION
Qty: 1 INHALER | Refills: 11 | Status: SHIPPED | OUTPATIENT
Start: 2020-09-15 | End: 2021-06-29

## 2020-09-15 NOTE — TELEPHONE ENCOUNTER
Patients insurance denied covering Asmanex.  Per their formulary a new rx for Flovent 44 HFA was sent to her pharmacy.  Patient has been notified of this change.

## 2021-04-30 ENCOUNTER — IMMUNIZATION (OUTPATIENT)
Dept: VACCINE CLINIC | Facility: HOSPITAL | Age: 51
End: 2021-04-30

## 2021-04-30 PROCEDURE — 0001A: CPT | Performed by: THORACIC SURGERY (CARDIOTHORACIC VASCULAR SURGERY)

## 2021-04-30 PROCEDURE — 91300 HC SARSCOV02 VAC 30MCG/0.3ML IM: CPT | Performed by: THORACIC SURGERY (CARDIOTHORACIC VASCULAR SURGERY)

## 2021-05-21 ENCOUNTER — IMMUNIZATION (OUTPATIENT)
Dept: VACCINE CLINIC | Facility: HOSPITAL | Age: 51
End: 2021-05-21

## 2021-05-21 PROCEDURE — 91300 HC SARSCOV02 VAC 30MCG/0.3ML IM: CPT | Performed by: THORACIC SURGERY (CARDIOTHORACIC VASCULAR SURGERY)

## 2021-05-21 PROCEDURE — 0002A: CPT | Performed by: THORACIC SURGERY (CARDIOTHORACIC VASCULAR SURGERY)

## 2021-06-29 ENCOUNTER — OFFICE VISIT (OUTPATIENT)
Dept: PULMONOLOGY | Facility: CLINIC | Age: 51
End: 2021-06-29

## 2021-06-29 VITALS
SYSTOLIC BLOOD PRESSURE: 126 MMHG | WEIGHT: 208.8 LBS | HEIGHT: 62 IN | BODY MASS INDEX: 38.42 KG/M2 | HEART RATE: 100 BPM | DIASTOLIC BLOOD PRESSURE: 90 MMHG | OXYGEN SATURATION: 98 %

## 2021-06-29 DIAGNOSIS — J45.41 MODERATE PERSISTENT ASTHMA WITH ACUTE EXACERBATION: Primary | ICD-10-CM

## 2021-06-29 PROCEDURE — 99213 OFFICE O/P EST LOW 20 MIN: CPT | Performed by: INTERNAL MEDICINE

## 2021-06-29 RX ORDER — FLUTICASONE PROPIONATE 50 MCG
2 SPRAY, SUSPENSION (ML) NASAL DAILY
Qty: 16 G | Refills: 11 | Status: SHIPPED | OUTPATIENT
Start: 2021-06-29

## 2021-06-29 RX ORDER — AMITRIPTYLINE HYDROCHLORIDE 10 MG/1
10 TABLET, FILM COATED ORAL NIGHTLY
COMMUNITY

## 2021-06-29 RX ORDER — ALBUTEROL SULFATE 90 UG/1
2 AEROSOL, METERED RESPIRATORY (INHALATION) EVERY 4 HOURS PRN
Qty: 18 G | Refills: 11 | Status: SHIPPED | OUTPATIENT
Start: 2021-06-29

## 2021-06-29 RX ORDER — MONTELUKAST SODIUM 10 MG/1
10 TABLET ORAL
Qty: 90 TABLET | Refills: 4 | Status: SHIPPED | OUTPATIENT
Start: 2021-06-29

## 2021-06-29 RX ORDER — FEXOFENADINE HCL 180 MG/1
180 TABLET ORAL DAILY
COMMUNITY

## 2021-06-29 RX ORDER — ERGOCALCIFEROL 1.25 MG/1
50000 CAPSULE ORAL
COMMUNITY

## 2021-06-29 RX ORDER — ASPIRIN 81 MG/1
81 TABLET ORAL DAILY
COMMUNITY

## 2021-06-29 RX ORDER — BUDESONIDE AND FORMOTEROL FUMARATE DIHYDRATE 160; 4.5 UG/1; UG/1
2 AEROSOL RESPIRATORY (INHALATION)
Qty: 1 EACH | Refills: 11 | Status: SHIPPED | OUTPATIENT
Start: 2021-06-29

## 2021-06-29 NOTE — PROGRESS NOTES
Pulmonary Office Follow-up    Subjective     Lora Steiner is seen today at the office for   Chief Complaint   Patient presents with   • Asthma         HPI  Lora Steiner is a 50 y.o. female with a PMH significant for asthma    Patient has been having more shortness of breath with exertion and needing her rescue inhaler a few times a week. She is currently on Flovent 44mcg bid. Also on Singulair, Flonase and Albuterol prn. Waking up a few times at night gasping, and does snore.    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      Tobacco use history:  never      Patient Active Problem List   Diagnosis   • Abdominal pain   • Abnormal mammogram   • Back pain   • Skin sensation disturbance   • Abnormal radiographic examination   • Otalgia   • Allergic rhinitis   • Class 2 obesity due to excess calories without serious comorbidity with body mass index (BMI) of 35.0 to 35.9 in adult   • Left shoulder pain   • Impingement syndrome, shoulder, left   • Rotator cuff syndrome of left shoulder   • Left cervical radiculopathy   • Acute pain of left shoulder   • Neck pain   • Dizzy spells   • Dysfunction of both eustachian tubes   • Low iron   • Status post arthroscopy of shoulder   • Chronic seasonal allergic rhinitis due to pollen   • Gastroesophageal reflux disease   • Mild intermittent asthma without complication   • Moderate persistent asthma with acute exacerbation         Medications, Allergies, Social, and Family Histories reviewed as per EMR.    Objective     Vitals:    06/29/21 1544   BP: 126/90   Pulse: 100   SpO2: 98%         06/29/21  1544   Weight: 94.7 kg (208 lb 12.8 oz)     [unfilled]  Physical Exam  Vitals reviewed.   Constitutional:       Appearance: Normal appearance. She is obese.   HENT:      Head: Normocephalic and atraumatic.      Nose: Nose normal.      Mouth/Throat:      Mouth: Mucous membranes are moist.      Pharynx: Oropharynx is clear.   Eyes:      Conjunctiva/sclera: Conjunctivae normal.       Pupils: Pupils are equal, round, and reactive to light.   Cardiovascular:      Rate and Rhythm: Normal rate and regular rhythm.      Pulses: Normal pulses.      Heart sounds: Normal heart sounds.   Pulmonary:      Effort: Pulmonary effort is normal.      Breath sounds: Normal breath sounds.   Abdominal:      General: Abdomen is flat. Bowel sounds are normal.      Palpations: Abdomen is soft.   Musculoskeletal:         General: Normal range of motion.      Cervical back: Normal range of motion.   Skin:     General: Skin is warm and dry.   Neurological:      General: No focal deficit present.      Mental Status: She is alert and oriented to person, place, and time.   Psychiatric:         Mood and Affect: Mood normal.         Behavior: Behavior normal.             Assessment/Plan     Diagnoses and all orders for this visit:    1. Moderate persistent asthma with acute exacerbation (Primary)  -     Spirometry With Bronchodilator; Future  -     albuterol sulfate HFA (Ventolin HFA) 108 (90 Base) MCG/ACT inhaler; Inhale 2 puffs Every 4 (Four) Hours As Needed for Wheezing or Shortness of Air.  Dispense: 18 g; Refill: 11  -     montelukast (SINGULAIR) 10 MG tablet; Take 1 tablet by mouth every night at bedtime.  Dispense: 90 tablet; Refill: 4    Other orders  -     budesonide-formoterol (SYMBICORT) 160-4.5 MCG/ACT inhaler; Inhale 2 puffs 2 (Two) Times a Day.  Dispense: 1 each; Refill: 11  -     fluticasone (FLONASE) 50 MCG/ACT nasal spray; 2 sprays into the nostril(s) as directed by provider Daily.  Dispense: 16 g; Refill: 11         Discussion/ Recommendations:   Patient with moderate asthma, not very well controlled. Will step up her therapy to LABA/ICS. She has some symptoms of sleep apnea and would probably benefit from a PSG. Will discuss that at follow up more. She hasn't had PFTs in several years so will check those as well at follow up. Will see her in about a month for recheck of symptoms and BRIDGER use          Return  in about 4 weeks (around 7/27/2021).          This document has been electronically signed by Anju Hobson DO on June 29, 2021 16:10 CDT

## 2021-07-29 ENCOUNTER — OFFICE VISIT (OUTPATIENT)
Dept: PULMONOLOGY | Facility: CLINIC | Age: 51
End: 2021-07-29

## 2021-07-29 ENCOUNTER — PROCEDURE VISIT (OUTPATIENT)
Dept: PULMONOLOGY | Facility: CLINIC | Age: 51
End: 2021-07-29

## 2021-07-29 VITALS
OXYGEN SATURATION: 99 % | HEIGHT: 62 IN | BODY MASS INDEX: 38.28 KG/M2 | SYSTOLIC BLOOD PRESSURE: 120 MMHG | HEART RATE: 86 BPM | WEIGHT: 208 LBS | DIASTOLIC BLOOD PRESSURE: 82 MMHG | TEMPERATURE: 97.1 F

## 2021-07-29 DIAGNOSIS — J45.20 MILD INTERMITTENT ASTHMA WITHOUT COMPLICATION: Primary | ICD-10-CM

## 2021-07-29 DIAGNOSIS — J45.41 MODERATE PERSISTENT ASTHMA WITH ACUTE EXACERBATION: ICD-10-CM

## 2021-07-29 DIAGNOSIS — J30.1 CHRONIC SEASONAL ALLERGIC RHINITIS DUE TO POLLEN: ICD-10-CM

## 2021-07-29 PROCEDURE — 94060 EVALUATION OF WHEEZING: CPT | Performed by: INTERNAL MEDICINE

## 2021-07-29 PROCEDURE — 99213 OFFICE O/P EST LOW 20 MIN: CPT | Performed by: INTERNAL MEDICINE

## 2021-07-29 NOTE — PROGRESS NOTES
Spirometry Pre/Post performed.     4 puffs Albuterol, tolerated well.     Post Spirometry performed 5-10 mins after bronchodilator performed.     Good patient effort and cooperation.     Ordered by Dr. Hobson, read by Dr. Hobson

## 2021-07-29 NOTE — PROGRESS NOTES
Pulmonary Office Follow-up    Subjective     Lora Steiner is seen today at the office for   Chief Complaint   Patient presents with   • Asthma         HPI  Lora Steiner is a 50 y.o. female with a PMH significant for asthma    Doing better on Symbicort, not really needing rescue inhaler. PFTs done today were completely normal    Last OV 6/29/21  Patient has been having more shortness of breath with exertion and needing her rescue inhaler a few times a week. She is currently on Flovent 44mcg bid. Also on Singulair, Flonase and Albuterol prn. Waking up a few times at night gasping, and does snore.    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      Tobacco use history:  never      Patient Active Problem List   Diagnosis   • Abdominal pain   • Abnormal mammogram   • Back pain   • Skin sensation disturbance   • Abnormal radiographic examination   • Otalgia   • Allergic rhinitis   • Class 2 obesity due to excess calories without serious comorbidity with body mass index (BMI) of 35.0 to 35.9 in adult   • Left shoulder pain   • Impingement syndrome, shoulder, left   • Rotator cuff syndrome of left shoulder   • Left cervical radiculopathy   • Acute pain of left shoulder   • Neck pain   • Dizzy spells   • Dysfunction of both eustachian tubes   • Low iron   • Status post arthroscopy of shoulder   • Chronic seasonal allergic rhinitis due to pollen   • Gastroesophageal reflux disease   • Mild intermittent asthma without complication   • Moderate persistent asthma with acute exacerbation         Medications, Allergies, Social, and Family Histories reviewed as per EMR.    Objective     Vitals:    07/29/21 1508   BP: 120/82   Pulse: 86   Temp: 97.1 °F (36.2 °C)   SpO2: 99%         07/29/21  1508   Weight: 94.3 kg (208 lb)     [unfilled]  Physical Exam  Vitals reviewed.   Constitutional:       Appearance: Normal appearance. She is obese.   HENT:      Head: Normocephalic and atraumatic.      Nose: Nose normal.       Mouth/Throat:      Mouth: Mucous membranes are moist.      Pharynx: Oropharynx is clear.   Eyes:      Conjunctiva/sclera: Conjunctivae normal.      Pupils: Pupils are equal, round, and reactive to light.   Cardiovascular:      Rate and Rhythm: Normal rate and regular rhythm.      Pulses: Normal pulses.      Heart sounds: Normal heart sounds.   Pulmonary:      Effort: Pulmonary effort is normal.      Breath sounds: Normal breath sounds.   Abdominal:      General: Abdomen is flat. Bowel sounds are normal.      Palpations: Abdomen is soft.   Musculoskeletal:         General: Normal range of motion.      Cervical back: Normal range of motion.   Skin:     General: Skin is warm and dry.   Neurological:      General: No focal deficit present.      Mental Status: She is alert and oriented to person, place, and time.   Psychiatric:         Mood and Affect: Mood normal.         Behavior: Behavior normal.             Assessment/Plan     Diagnoses and all orders for this visit:    1. Mild intermittent asthma without complication (Primary)    2. Chronic seasonal allergic rhinitis due to pollen         Discussion/ Recommendations:   Patient doing much better on symbicort. Since she reports spring and summer are worse for her, will leave her on that for now. Can follow up in 3 months and maybe step back down to ICS only. Continue BRIDGER as needed          Return in about 3 months (around 10/29/2021).          This document has been electronically signed by Anju Hobson DO on July 29, 2021 16:13 CDT

## 2021-10-21 ENCOUNTER — OFFICE VISIT (OUTPATIENT)
Dept: PULMONOLOGY | Facility: CLINIC | Age: 51
End: 2021-10-21

## 2021-10-21 VITALS
TEMPERATURE: 93.2 F | SYSTOLIC BLOOD PRESSURE: 126 MMHG | HEART RATE: 91 BPM | WEIGHT: 200 LBS | DIASTOLIC BLOOD PRESSURE: 82 MMHG | HEIGHT: 62 IN | BODY MASS INDEX: 36.8 KG/M2 | OXYGEN SATURATION: 99 %

## 2021-10-21 DIAGNOSIS — J45.20 MILD INTERMITTENT ASTHMA WITHOUT COMPLICATION: Primary | ICD-10-CM

## 2021-10-21 PROCEDURE — 99213 OFFICE O/P EST LOW 20 MIN: CPT | Performed by: INTERNAL MEDICINE

## 2021-10-21 RX ORDER — BUSPIRONE HYDROCHLORIDE 5 MG/1
5 TABLET ORAL 3 TIMES DAILY
COMMUNITY

## 2021-10-21 RX ORDER — MONTELUKAST SODIUM 10 MG/1
10 TABLET ORAL NIGHTLY
Qty: 30 TABLET | Refills: 11 | Status: SHIPPED | OUTPATIENT
Start: 2021-10-21

## 2021-10-21 NOTE — PROGRESS NOTES
Pulmonary Office Follow-up    Subjective     oLra Steiner is seen today at the office for   Chief Complaint   Patient presents with   • Asthma         HPI  Lora Steiner is a 50 y.o. female with a PMH significant for asthma    10/21/21  Patient here for follow up. She continues to do well with Symbicort. No recent use of albtuerol No exacerbations since last visit    OV 7/29/21  Doing better on Symbicort, not really needing rescue inhaler. PFTs done today were completely normal    Last OV 6/29/21  Patient has been having more shortness of breath with exertion and needing her rescue inhaler a few times a week. She is currently on Flovent 44mcg bid. Also on Singulair, Flonase and Albuterol prn. Waking up a few times at night gasping, and does snore.    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      Tobacco use history:  never      Patient Active Problem List   Diagnosis   • Abdominal pain   • Abnormal mammogram   • Back pain   • Skin sensation disturbance   • Abnormal radiographic examination   • Otalgia   • Allergic rhinitis   • Class 2 obesity due to excess calories without serious comorbidity with body mass index (BMI) of 35.0 to 35.9 in adult   • Left shoulder pain   • Impingement syndrome, shoulder, left   • Rotator cuff syndrome of left shoulder   • Left cervical radiculopathy   • Acute pain of left shoulder   • Neck pain   • Dizzy spells   • Dysfunction of both eustachian tubes   • Low iron   • Status post arthroscopy of shoulder   • Chronic seasonal allergic rhinitis due to pollen   • Gastroesophageal reflux disease   • Mild intermittent asthma without complication   • Moderate persistent asthma with acute exacerbation         Medications, Allergies, Social, and Family Histories reviewed as per EMR.    Objective     Vitals:    10/21/21 1501   BP: 126/82   Pulse: 91   Temp: 93.2 °F (34 °C)   SpO2: 99%         10/21/21  1501   Weight: 90.7 kg (200 lb)     [unfilled]  Physical Exam  Vitals reviewed.    Constitutional:       Appearance: Normal appearance. She is obese.   HENT:      Head: Normocephalic and atraumatic.      Nose: Nose normal.      Mouth/Throat:      Mouth: Mucous membranes are moist.      Pharynx: Oropharynx is clear.   Eyes:      Conjunctiva/sclera: Conjunctivae normal.      Pupils: Pupils are equal, round, and reactive to light.   Cardiovascular:      Rate and Rhythm: Normal rate and regular rhythm.      Pulses: Normal pulses.      Heart sounds: Normal heart sounds.   Pulmonary:      Effort: Pulmonary effort is normal.      Breath sounds: Normal breath sounds.   Abdominal:      General: Abdomen is flat. Bowel sounds are normal.      Palpations: Abdomen is soft.   Musculoskeletal:         General: Normal range of motion.      Cervical back: Normal range of motion.   Skin:     General: Skin is warm and dry.   Neurological:      General: No focal deficit present.      Mental Status: She is alert and oriented to person, place, and time.   Psychiatric:         Mood and Affect: Mood normal.         Behavior: Behavior normal.             Assessment/Plan     Diagnoses and all orders for this visit:    1. Mild intermittent asthma without complication (Primary)    Other orders  -     budesonide (PULMICORT) 180 MCG/ACT inhaler; Inhale 1 puff 2 (Two) Times a Day.  Dispense: 1 each; Refill: 11  -     montelukast (SINGULAIR) 10 MG tablet; Take 1 tablet by mouth Every Night.  Dispense: 30 tablet; Refill: 11         Discussion/ Recommendations:   Patient continues to do well on Symbicort. She thinks she is over her bad allergy season so will try stepping her down to budesonde only and see how she does. If symptoms worsen, she can stop back up to Symbicort. But if she does well, can stay on this and we will follow up in March before the spring pollen season hits        Return in about 5 months (around 3/21/2022).          This document has been electronically signed by Anju Hobson DO on October 21, 2021  15:18 CDT

## 2021-12-29 ENCOUNTER — HOSPITAL ENCOUNTER (EMERGENCY)
Facility: HOSPITAL | Age: 51
Discharge: HOME | End: 2021-12-29
Admitting: PHYSICIAN ASSISTANT

## 2021-12-29 VITALS
DIASTOLIC BLOOD PRESSURE: 90 MMHG | RESPIRATION RATE: 18 BRPM | HEART RATE: 79 BPM | OXYGEN SATURATION: 99 % | SYSTOLIC BLOOD PRESSURE: 133 MMHG | TEMPERATURE: 98.4 F

## 2021-12-29 PROCEDURE — 25010000002 INJECTION, BAMLANIVIMAB AND ETESEVIMAB, 2100 MG: Performed by: PHYSICIAN ASSISTANT

## 2021-12-29 PROCEDURE — 99202 OFFICE O/P NEW SF 15 MIN: CPT

## 2021-12-29 PROCEDURE — M0245 HC IV INFUSION, BAMLANIVIMAB AND ETESEVIMAB, 2100 MG: HCPCS | Performed by: PHYSICIAN ASSISTANT

## 2021-12-29 RX ORDER — DIPHENHYDRAMINE HYDROCHLORIDE 50 MG/ML
50 INJECTION INTRAMUSCULAR; INTRAVENOUS ONCE AS NEEDED
Status: DISCONTINUED | OUTPATIENT
Start: 2021-12-29 | End: 2021-12-29 | Stop reason: HOSPADM

## 2021-12-29 RX ORDER — DIPHENHYDRAMINE HCL 50 MG
50 CAPSULE ORAL ONCE AS NEEDED
Status: DISCONTINUED | OUTPATIENT
Start: 2021-12-29 | End: 2021-12-29 | Stop reason: HOSPADM

## 2021-12-29 RX ORDER — EPINEPHRINE 1 MG/ML
0.3 INJECTION, SOLUTION INTRAMUSCULAR; SUBCUTANEOUS ONCE AS NEEDED
Status: DISCONTINUED | OUTPATIENT
Start: 2021-12-29 | End: 2021-12-29 | Stop reason: HOSPADM

## 2021-12-29 RX ORDER — METHYLPREDNISOLONE SODIUM SUCCINATE 125 MG/2ML
125 INJECTION, POWDER, LYOPHILIZED, FOR SOLUTION INTRAMUSCULAR; INTRAVENOUS ONCE AS NEEDED
Status: DISCONTINUED | OUTPATIENT
Start: 2021-12-29 | End: 2021-12-29 | Stop reason: HOSPADM

## 2021-12-29 RX ORDER — SODIUM CHLORIDE 9 MG/ML
30 INJECTION, SOLUTION INTRAVENOUS ONCE
Status: COMPLETED | OUTPATIENT
Start: 2021-12-29 | End: 2021-12-29

## 2021-12-29 RX ADMIN — SODIUM CHLORIDE: 9 INJECTION, SOLUTION INTRAVENOUS at 16:21

## 2021-12-29 RX ADMIN — SODIUM CHLORIDE 30 ML: 9 INJECTION, SOLUTION INTRAVENOUS at 16:41

## 2023-08-03 PROBLEM — B00.1 FEVER BLISTER: Status: ACTIVE | Noted: 2021-11-23

## 2023-08-03 PROBLEM — M25.50 ARTHRALGIA: Status: ACTIVE | Noted: 2019-09-12

## 2023-08-03 PROBLEM — R00.2 PALPITATIONS: Status: ACTIVE | Noted: 2022-05-19

## 2023-08-03 PROBLEM — G43.909 MIGRAINE WITHOUT STATUS MIGRAINOSUS, NOT INTRACTABLE: Status: ACTIVE | Noted: 2018-03-01

## 2023-08-03 PROBLEM — E53.8 B12 DEFICIENCY: Status: ACTIVE | Noted: 2023-04-03

## 2023-08-14 ENCOUNTER — APPOINTMENT (OUTPATIENT)
Dept: GENERAL RADIOLOGY | Facility: HOSPITAL | Age: 53
End: 2023-08-14
Payer: COMMERCIAL

## 2023-08-14 ENCOUNTER — HOSPITAL ENCOUNTER (EMERGENCY)
Facility: HOSPITAL | Age: 53
Discharge: HOME | End: 2023-08-14
Attending: STUDENT IN AN ORGANIZED HEALTH CARE EDUCATION/TRAINING PROGRAM | Admitting: STUDENT IN AN ORGANIZED HEALTH CARE EDUCATION/TRAINING PROGRAM
Payer: COMMERCIAL

## 2023-08-14 ENCOUNTER — APPOINTMENT (OUTPATIENT)
Dept: CT IMAGING | Facility: HOSPITAL | Age: 53
End: 2023-08-14
Payer: COMMERCIAL

## 2023-08-14 VITALS
SYSTOLIC BLOOD PRESSURE: 113 MMHG | HEIGHT: 62 IN | HEART RATE: 68 BPM | OXYGEN SATURATION: 97 % | TEMPERATURE: 97.8 F | WEIGHT: 205.9 LBS | DIASTOLIC BLOOD PRESSURE: 60 MMHG | RESPIRATION RATE: 20 BRPM | BODY MASS INDEX: 37.89 KG/M2

## 2023-08-14 DIAGNOSIS — R07.9 CHEST PAIN, UNSPECIFIED TYPE: Primary | ICD-10-CM

## 2023-08-14 LAB
ALBUMIN SERPL-MCNC: 4.2 G/DL (ref 3.5–5.2)
ALBUMIN/GLOB SERPL: 1 G/DL
ALP SERPL-CCNC: 68 U/L (ref 39–117)
ALT SERPL W P-5'-P-CCNC: 12 U/L (ref 1–33)
ANION GAP SERPL CALCULATED.3IONS-SCNC: 10 MMOL/L (ref 5–15)
AST SERPL-CCNC: 15 U/L (ref 1–32)
BASOPHILS # BLD AUTO: 0.2 10*3/MM3 (ref 0–0.2)
BASOPHILS NFR BLD AUTO: 1.9 % (ref 0–1.5)
BILIRUB SERPL-MCNC: 0.2 MG/DL (ref 0–1.2)
BUN SERPL-MCNC: 11 MG/DL (ref 6–20)
BUN/CREAT SERPL: 13.1 (ref 7–25)
CALCIUM SPEC-SCNC: 9.1 MG/DL (ref 8.6–10.5)
CHLORIDE SERPL-SCNC: 104 MMOL/L (ref 98–107)
CO2 SERPL-SCNC: 25 MMOL/L (ref 22–29)
CREAT SERPL-MCNC: 0.84 MG/DL (ref 0.57–1)
D-DIMER, QUANTITATIVE (MAD,POW, STR): 640 NG/ML (FEU) (ref 0–520)
DEPRECATED RDW RBC AUTO: 41.2 FL (ref 37–54)
EGFRCR SERPLBLD CKD-EPI 2021: 83.7 ML/MIN/1.73
EOSINOPHIL # BLD AUTO: 0.4 10*3/MM3 (ref 0–0.4)
EOSINOPHIL NFR BLD AUTO: 3.8 % (ref 0.3–6.2)
ERYTHROCYTE [DISTWIDTH] IN BLOOD BY AUTOMATED COUNT: 16.2 % (ref 12.3–15.4)
GLOBULIN UR ELPH-MCNC: 4.1 GM/DL
GLUCOSE SERPL-MCNC: 94 MG/DL (ref 65–99)
HCT VFR BLD AUTO: 36.1 % (ref 34–46.6)
HGB BLD-MCNC: 11.2 G/DL (ref 12–15.9)
HOLD SPECIMEN: NORMAL
HOLD SPECIMEN: NORMAL
IMM GRANULOCYTES # BLD AUTO: 0.05 10*3/MM3 (ref 0–0.05)
IMM GRANULOCYTES NFR BLD AUTO: 0.5 % (ref 0–0.5)
LYMPHOCYTES # BLD AUTO: 3.13 10*3/MM3 (ref 0.7–3.1)
LYMPHOCYTES NFR BLD AUTO: 29.9 % (ref 19.6–45.3)
MCH RBC QN AUTO: 22.4 PG (ref 26.6–33)
MCHC RBC AUTO-ENTMCNC: 31 G/DL (ref 31.5–35.7)
MCV RBC AUTO: 72.3 FL (ref 79–97)
MONOCYTES # BLD AUTO: 1.52 10*3/MM3 (ref 0.1–0.9)
MONOCYTES NFR BLD AUTO: 14.5 % (ref 5–12)
NEUTROPHILS NFR BLD AUTO: 49.4 % (ref 42.7–76)
NEUTROPHILS NFR BLD AUTO: 5.16 10*3/MM3 (ref 1.7–7)
NRBC BLD AUTO-RTO: 0 /100 WBC (ref 0–0.2)
NT-PROBNP SERPL-MCNC: 71.2 PG/ML (ref 0–900)
PLATELET # BLD AUTO: 360 10*3/MM3 (ref 140–450)
PMV BLD AUTO: 10.1 FL (ref 6–12)
POTASSIUM SERPL-SCNC: 3.8 MMOL/L (ref 3.5–5.2)
PROT SERPL-MCNC: 8.3 G/DL (ref 6–8.5)
RBC # BLD AUTO: 4.99 10*6/MM3 (ref 3.77–5.28)
SODIUM SERPL-SCNC: 139 MMOL/L (ref 136–145)
TROPONIN T SERPL HS-MCNC: <6 NG/L
WBC NRBC COR # BLD: 10.46 10*3/MM3 (ref 3.4–10.8)
WHOLE BLOOD HOLD COAG: NORMAL
WHOLE BLOOD HOLD SPECIMEN: NORMAL

## 2023-08-14 PROCEDURE — 96375 TX/PRO/DX INJ NEW DRUG ADDON: CPT

## 2023-08-14 PROCEDURE — 99285 EMERGENCY DEPT VISIT HI MDM: CPT

## 2023-08-14 PROCEDURE — 84484 ASSAY OF TROPONIN QUANT: CPT | Performed by: STUDENT IN AN ORGANIZED HEALTH CARE EDUCATION/TRAINING PROGRAM

## 2023-08-14 PROCEDURE — 25510000001 IOPAMIDOL PER 1 ML: Performed by: STUDENT IN AN ORGANIZED HEALTH CARE EDUCATION/TRAINING PROGRAM

## 2023-08-14 PROCEDURE — 80053 COMPREHEN METABOLIC PANEL: CPT | Performed by: STUDENT IN AN ORGANIZED HEALTH CARE EDUCATION/TRAINING PROGRAM

## 2023-08-14 PROCEDURE — 36415 COLL VENOUS BLD VENIPUNCTURE: CPT

## 2023-08-14 PROCEDURE — 25010000002 MORPHINE PER 10 MG: Performed by: STUDENT IN AN ORGANIZED HEALTH CARE EDUCATION/TRAINING PROGRAM

## 2023-08-14 PROCEDURE — 25010000002 ONDANSETRON PER 1 MG: Performed by: STUDENT IN AN ORGANIZED HEALTH CARE EDUCATION/TRAINING PROGRAM

## 2023-08-14 PROCEDURE — 71275 CT ANGIOGRAPHY CHEST: CPT

## 2023-08-14 PROCEDURE — 71045 X-RAY EXAM CHEST 1 VIEW: CPT

## 2023-08-14 PROCEDURE — 96374 THER/PROPH/DIAG INJ IV PUSH: CPT

## 2023-08-14 PROCEDURE — 85025 COMPLETE CBC W/AUTO DIFF WBC: CPT | Performed by: STUDENT IN AN ORGANIZED HEALTH CARE EDUCATION/TRAINING PROGRAM

## 2023-08-14 PROCEDURE — 93005 ELECTROCARDIOGRAM TRACING: CPT | Performed by: STUDENT IN AN ORGANIZED HEALTH CARE EDUCATION/TRAINING PROGRAM

## 2023-08-14 PROCEDURE — 85379 FIBRIN DEGRADATION QUANT: CPT | Performed by: STUDENT IN AN ORGANIZED HEALTH CARE EDUCATION/TRAINING PROGRAM

## 2023-08-14 PROCEDURE — 83880 ASSAY OF NATRIURETIC PEPTIDE: CPT | Performed by: STUDENT IN AN ORGANIZED HEALTH CARE EDUCATION/TRAINING PROGRAM

## 2023-08-14 RX ORDER — ONDANSETRON 2 MG/ML
4 INJECTION INTRAMUSCULAR; INTRAVENOUS ONCE
Status: COMPLETED | OUTPATIENT
Start: 2023-08-14 | End: 2023-08-14

## 2023-08-14 RX ORDER — ASPIRIN 81 MG/1
324 TABLET, CHEWABLE ORAL ONCE
Status: DISCONTINUED | OUTPATIENT
Start: 2023-08-14 | End: 2023-08-14

## 2023-08-14 RX ORDER — SODIUM CHLORIDE 0.9 % (FLUSH) 0.9 %
10 SYRINGE (ML) INJECTION AS NEEDED
Status: DISCONTINUED | OUTPATIENT
Start: 2023-08-14 | End: 2023-08-15 | Stop reason: HOSPADM

## 2023-08-14 RX ADMIN — ONDANSETRON 4 MG: 2 INJECTION INTRAMUSCULAR; INTRAVENOUS at 21:02

## 2023-08-14 RX ADMIN — IOPAMIDOL 70 ML: 755 INJECTION, SOLUTION INTRAVENOUS at 22:14

## 2023-08-14 RX ADMIN — MORPHINE SULFATE 4 MG: 4 INJECTION, SOLUTION INTRAMUSCULAR; INTRAVENOUS at 21:02

## 2023-08-15 LAB
QT INTERVAL: 386 MS
QTC INTERVAL: 404 MS

## 2023-08-15 NOTE — ED PROVIDER NOTES
Subjective   History of Present Illness  52-year-old female with history of tachycardia comes to the ER chief complaint of sharp pain that started a couple hours ago.  Nothing makes it better or worse.  She denies other symptoms.    History provided by:  Patient   used: No      Review of Systems   Constitutional:  Negative for chills and fever.   HENT:  Negative for drooling.    Eyes:  Negative for redness.   Respiratory:  Negative for cough, chest tightness, shortness of breath and wheezing.    Cardiovascular:  Positive for chest pain. Negative for palpitations.   Gastrointestinal:  Negative for abdominal pain, nausea and vomiting.   Genitourinary:  Negative for flank pain.   Skin:  Negative for color change.   Neurological:  Negative for seizures.   Psychiatric/Behavioral:  Negative for confusion.      Past Medical History:   Diagnosis Date    Asthma     GERD (gastroesophageal reflux disease)     Hand pain     bilateral    Kidney stone     had previous stent in right kidney    Migraines     Shoulder pain, left     Wears glasses        Allergies   Allergen Reactions    Cefuroxime Hives       Past Surgical History:   Procedure Laterality Date    CHOLECYSTECTOMY      SHOULDER ARTHROSCOPY Left 4/13/2018    Procedure: arthroscopy of the left shoulder with subacromial decompression and Concha procedure.;  Surgeon: Aaron Tan MD;  Location: [facility];  Service: Orthopedics    VAGINAL DELIVERY      hx of 3 spontaneous abortions in 1995,1996,2002       Family History   Problem Relation Age of Onset    Hypertension Father     Asthma Other     Cancer Other     Diabetes Other     Kidney disease Other     Migraines Other        Social History     Socioeconomic History    Marital status:    Tobacco Use    Smoking status: Never    Smokeless tobacco: Never   Vaping Use    Vaping Use: Never used   Substance and Sexual Activity    Alcohol use: No    Drug use: No    Sexual activity: Defer            Objective   Vitals:    08/14/23 2100 08/14/23 2116 08/14/23 2130 08/14/23 2200   BP: 128/63 121/63 119/72 113/60   BP Location:       Patient Position:       Pulse: 68 68 68 68   Resp: 20 20 18 20   Temp:       TempSrc:       SpO2: 98% 97% 96% 97%   Weight:       Height:           Physical Exam  Vitals and nursing note reviewed.   Constitutional:       General: She is not in acute distress.     Appearance: She is well-developed. She is obese. She is not ill-appearing, toxic-appearing or diaphoretic.   Eyes:      Conjunctiva/sclera: Conjunctivae normal.   Pulmonary:      Effort: Pulmonary effort is normal. No accessory muscle usage or respiratory distress.   Chest:      Chest wall: No tenderness.   Abdominal:      Palpations: Abdomen is soft.      Tenderness: There is no abdominal tenderness (deep palpation).   Skin:     General: Skin is warm and dry.      Capillary Refill: Capillary refill takes less than 2 seconds.   Neurological:      Mental Status: She is alert and oriented to person, place, and time.       ECG 12 Lead Chest Pain      Date/Time: 8/14/2023 7:53 PM  Performed by: Donald Hyde MD  Authorized by: Donald Hyde MD   Interpreted by physician  Rhythm: sinus rhythm  Rate: normal  QRS axis: normal  ST segment elevation noted on lead: none.  Clinical impression: abnormal ECG       HEART Score for Major Cardiac Events - MDCalc  3 points -> Low Score (0-3 points) Risk of MACE of 0.9-1.7%.      ED Course      Results for orders placed or performed during the hospital encounter of 08/14/23   High Sensitivity Troponin T    Specimen: Blood   Result Value Ref Range    HS Troponin T <6 <10 ng/L   Comprehensive Metabolic Panel    Specimen: Blood   Result Value Ref Range    Glucose 94 65 - 99 mg/dL    BUN 11 6 - 20 mg/dL    Creatinine 0.84 0.57 - 1.00 mg/dL    Sodium 139 136 - 145 mmol/L    Potassium 3.8 3.5 - 5.2 mmol/L    Chloride 104 98 - 107 mmol/L    CO2 25.0 22.0 - 29.0 mmol/L    Calcium 9.1  8.6 - 10.5 mg/dL    Total Protein 8.3 6.0 - 8.5 g/dL    Albumin 4.2 3.5 - 5.2 g/dL    ALT (SGPT) 12 1 - 33 U/L    AST (SGOT) 15 1 - 32 U/L    Alkaline Phosphatase 68 39 - 117 U/L    Total Bilirubin 0.2 0.0 - 1.2 mg/dL    Globulin 4.1 gm/dL    A/G Ratio 1.0 g/dL    BUN/Creatinine Ratio 13.1 7.0 - 25.0    Anion Gap 10.0 5.0 - 15.0 mmol/L    eGFR 83.7 >60.0 mL/min/1.73   BNP    Specimen: Blood   Result Value Ref Range    proBNP 71.2 0.0 - 900.0 pg/mL   CBC Auto Differential    Specimen: Blood   Result Value Ref Range    WBC 10.46 3.40 - 10.80 10*3/mm3    RBC 4.99 3.77 - 5.28 10*6/mm3    Hemoglobin 11.2 (L) 12.0 - 15.9 g/dL    Hematocrit 36.1 34.0 - 46.6 %    MCV 72.3 (L) 79.0 - 97.0 fL    MCH 22.4 (L) 26.6 - 33.0 pg    MCHC 31.0 (L) 31.5 - 35.7 g/dL    RDW 16.2 (H) 12.3 - 15.4 %    RDW-SD 41.2 37.0 - 54.0 fl    MPV 10.1 6.0 - 12.0 fL    Platelets 360 140 - 450 10*3/mm3    Neutrophil % 49.4 42.7 - 76.0 %    Lymphocyte % 29.9 19.6 - 45.3 %    Monocyte % 14.5 (H) 5.0 - 12.0 %    Eosinophil % 3.8 0.3 - 6.2 %    Basophil % 1.9 (H) 0.0 - 1.5 %    Immature Grans % 0.5 0.0 - 0.5 %    Neutrophils, Absolute 5.16 1.70 - 7.00 10*3/mm3    Lymphocytes, Absolute 3.13 (H) 0.70 - 3.10 10*3/mm3    Monocytes, Absolute 1.52 (H) 0.10 - 0.90 10*3/mm3    Eosinophils, Absolute 0.40 0.00 - 0.40 10*3/mm3    Basophils, Absolute 0.20 0.00 - 0.20 10*3/mm3    Immature Grans, Absolute 0.05 0.00 - 0.05 10*3/mm3    nRBC 0.0 0.0 - 0.2 /100 WBC   D-dimer, Quantitative    Specimen: Blood   Result Value Ref Range    D-Dimer, Quantitative 640 (H) 0 - 520 ng/mL (FEU)   ECG 12 Lead Chest Pain   Result Value Ref Range    QT Interval 386 ms    QTC Interval 404 ms   Gold Top - SST   Result Value Ref Range    Extra Tube Hold for add-ons.    Light Blue Top   Result Value Ref Range    Extra Tube Hold for add-ons.    Lavender Top   Result Value Ref Range    Extra Tube hold for add-on    Green Top (Gel)   Result Value Ref Range    Extra Tube Hold for add-ons.       CT Angiogram Chest   Final Result   1. No PE, no aortic dissection.            XR Chest 1 View   Final Result              Medical Decision Making  Vital signs are stable, afebrile.  Labs obtained significant for troponin that is negative.  D-dimer 640.  CTA chest negative for acute findings.  Pain medicine given.  EKG is sinus rhythm with no acute ischemic changes.  Heart score is below 4.  Recommend follow-up with her PCP.  Return precautions given.    Amount and/or Complexity of Data Reviewed  Labs: ordered.  Radiology: ordered.  ECG/medicine tests: ordered and independent interpretation performed.    Risk  Prescription drug management.        Final diagnoses:   Chest pain, unspecified type       ED Disposition  ED Disposition       ED Disposition   Discharge    Condition   Stable    Comment   --               Mahnaz Farr, APRN  [address] [address]  [phone]    Schedule an appointment as soon as possible for a visit in 2 days  As needed, ER follow up         Medication List      No changes were made to your prescriptions during this visit.            Donald Hyde MD  08/14/23 2322

## 2023-11-13 NOTE — PROGRESS NOTES
In an effort to ensure that our patients [employer], a Team Member has reviewed your chart and identified an opportunity to provide the best care possible. An attempt was made to discuss or schedule overdue Preventive or Disease Management screening. The Outcome was Contact was not made, left messageIf you have any questions or need help with scheduling, contact your primary care provider. [phone]. Care Gaps include Diabetes. Work note created.

## 2024-05-06 NOTE — ED TRIAGE NOTES
Patient presents to the ED with complaints of right sided headache with sudden onset around 1500. Patient states that vision in right eye became blurry and was like this for 45min. Patient states she does have history of migraines but nothing this strong. Patient did take 2 execedrin migraine prior to arrival.    
Patient states this is the worst headache that she has ever had.   
Standing/Walking/Toileting

## (undated) DEVICE — PAD,ABDOMINAL,8"X10",ST,LF: Brand: MEDLINE

## (undated) DEVICE — NDL HYPO PRECISIONGLIDE/REG 18G 1IN PNK

## (undated) DEVICE — Device

## (undated) DEVICE — GOWN,PREVENTION PLUS,XLONG/XLARGE,STRL: Brand: MEDLINE

## (undated) DEVICE — GLV SURG TRIUMPH LT PF LTX 7 STRL

## (undated) DEVICE — GLV SURG TRIUMPH LT PF LTX 8 STRL

## (undated) DEVICE — SUT ETHLN 3-0 FS118IN 663H

## (undated) DEVICE — GLV SURG SENSICARE GREEN W/ALOE PF LF 8.5 STRL

## (undated) DEVICE — TBG PUMP ARTHSCP MAIN AR6400 16FT

## (undated) DEVICE — BLD SHAVER RESECTOR SERR AGGR 5MM 13CM

## (undated) DEVICE — IMMOB SHLDR COTN/PLY W/STRAP MD

## (undated) DEVICE — GLV SURG SENSICARE GREEN W/ALOE PF LF 7 STRL

## (undated) DEVICE — ABL OPES COOLCUT ASPIR 3MM 90D

## (undated) DEVICE — CANN TWST IN 7CM 8.25MM ID

## (undated) DEVICE — GLV SURG SENSICARE ALOE LF PF SZ7.5 GRN

## (undated) DEVICE — 3M™ IOBAN™ 2 ANTIMICROBIAL INCISE DRAPE 6651EZ: Brand: IOBAN™ 2

## (undated) DEVICE — SPNG GZ WOVN 4X4IN 12PLY 10/BX STRL

## (undated) DEVICE — SYR 10ML

## (undated) DEVICE — STERILE POLYISOPRENE POWDER-FREE SURGICAL GLOVES WITH EMOLLIENT COATING: Brand: PROTEXIS